# Patient Record
Sex: FEMALE | Race: WHITE | NOT HISPANIC OR LATINO | Employment: FULL TIME | URBAN - METROPOLITAN AREA
[De-identification: names, ages, dates, MRNs, and addresses within clinical notes are randomized per-mention and may not be internally consistent; named-entity substitution may affect disease eponyms.]

---

## 2022-06-02 ENCOUNTER — OFFICE VISIT (OUTPATIENT)
Dept: INTERNAL MEDICINE CLINIC | Facility: CLINIC | Age: 32
End: 2022-06-02
Payer: COMMERCIAL

## 2022-06-02 VITALS
RESPIRATION RATE: 18 BRPM | OXYGEN SATURATION: 96 % | DIASTOLIC BLOOD PRESSURE: 78 MMHG | BODY MASS INDEX: 45.64 KG/M2 | TEMPERATURE: 97.8 F | SYSTOLIC BLOOD PRESSURE: 122 MMHG | HEIGHT: 66 IN | WEIGHT: 284 LBS | HEART RATE: 82 BPM

## 2022-06-02 DIAGNOSIS — Z11.4 SCREENING FOR HIV (HUMAN IMMUNODEFICIENCY VIRUS): ICD-10-CM

## 2022-06-02 DIAGNOSIS — Z13.228 SCREENING FOR ENDOCRINE/METABOLIC/IMMUNITY DISORDERS: ICD-10-CM

## 2022-06-02 DIAGNOSIS — Z13.0 SCREENING FOR ENDOCRINE/METABOLIC/IMMUNITY DISORDERS: ICD-10-CM

## 2022-06-02 DIAGNOSIS — E66.01 CLASS 3 SEVERE OBESITY DUE TO EXCESS CALORIES IN ADULT, UNSPECIFIED BMI, UNSPECIFIED WHETHER SERIOUS COMORBIDITY PRESENT (HCC): Primary | ICD-10-CM

## 2022-06-02 DIAGNOSIS — Z13.0 SCREENING FOR IRON DEFICIENCY ANEMIA: ICD-10-CM

## 2022-06-02 DIAGNOSIS — Z12.4 SCREENING FOR CERVICAL CANCER: ICD-10-CM

## 2022-06-02 DIAGNOSIS — Z11.59 NEED FOR HEPATITIS C SCREENING TEST: ICD-10-CM

## 2022-06-02 DIAGNOSIS — Q24.9 CARDIAC ABNORMALITY: ICD-10-CM

## 2022-06-02 DIAGNOSIS — Z13.29 SCREENING FOR ENDOCRINE/METABOLIC/IMMUNITY DISORDERS: ICD-10-CM

## 2022-06-02 PROBLEM — E66.813 CLASS 3 SEVERE OBESITY DUE TO EXCESS CALORIES IN ADULT (HCC): Status: ACTIVE | Noted: 2022-06-02

## 2022-06-02 PROBLEM — F41.0 PANIC DISORDER: Status: ACTIVE | Noted: 2022-06-02

## 2022-06-02 PROCEDURE — 3725F SCREEN DEPRESSION PERFORMED: CPT

## 2022-06-02 PROCEDURE — 1036F TOBACCO NON-USER: CPT

## 2022-06-02 PROCEDURE — 99385 PREV VISIT NEW AGE 18-39: CPT

## 2022-06-02 PROCEDURE — 3008F BODY MASS INDEX DOCD: CPT

## 2022-06-02 RX ORDER — ARIPIPRAZOLE 15 MG/1
15 TABLET ORAL DAILY
COMMUNITY

## 2022-06-02 RX ORDER — CLONAZEPAM 1 MG/1
1 TABLET ORAL 2 TIMES DAILY
COMMUNITY

## 2022-06-02 RX ORDER — LITHIUM CARBONATE 300 MG/1
300 CAPSULE ORAL 3 TIMES DAILY
COMMUNITY
Start: 2022-04-03

## 2022-06-02 RX ORDER — QUETIAPINE FUMARATE 200 MG/1
TABLET, FILM COATED ORAL
COMMUNITY
Start: 2022-03-29

## 2022-06-02 RX ORDER — DIPHENHYDRAMINE HCL 25 MG
25 TABLET ORAL AS NEEDED
COMMUNITY

## 2022-06-02 RX ORDER — LAMOTRIGINE 200 MG/1
200 TABLET ORAL 2 TIMES DAILY
COMMUNITY
Start: 2022-02-23

## 2022-06-02 RX ORDER — BUSPIRONE HYDROCHLORIDE 15 MG/1
TABLET ORAL
COMMUNITY
Start: 2022-06-01 | End: 2022-08-09 | Stop reason: ALTCHOICE

## 2022-06-02 NOTE — PROGRESS NOTES
INTERNAL MEDICINE INITIAL OFFICE VISIT  [facility] - 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  Has not seen a cardiologist in several years  Referral placed for follow up    2  Class 3 severe obesity due to excess calories in adult, unspecified BMI, unspecified whether serious comorbidity present (HCC)  Limit carbs, sweets, portion control, increase water intake  Walk 30-40 minutes per day  Focus on 10 lbs weight loss at a time to not become overwhelmed  Discussed weight loss medication however patient would need to be evaluated by cardiology first due to cardiac history  3  Anxiety/Panic disorder:  Follows with psychiatry  She feels she is on the proper combination of medications at this time  Has her medical marijuana card for anxiety  Labs ordered:  CBC  TSH  Lipids  HGA1C  CMP    Referral for gynecology placed    BMI Counseling: Body mass index is 45 37 kg/m²  The BMI is above normal  Nutrition recommendations include decreasing portion sizes, encouraging healthy choices of fruits and vegetables, limiting drinks that contain sugar and moderation in carbohydrate intake  Exercise recommendations include exercising 3-5 times per week  No pharmacotherapy was ordered  Rationale for BMI follow-up plan is due to patient being overweight or obese  Depression Screening and Follow-up Plan: Patient was screened for depression during today's encounter  They screened negative with a PHQ-2 score of 1  Return in about 3 months (around 2022)  Chief Complaint:     Chief Complaint   Patient presents with    [facility]      History of Present Illness:   Patient presents today in the office to establish  She denies any complications or complaints at this time   She is concerned about her weight and would like to discuss options at this time  She has not had a primary care provider in quite some time and has no baseline labs to review  In 2018 she was found to have a lesion on her myocardium which a piece broke off and logged in her lung  She was taken to the hospital where she had open heart surgery to remove the lesion  She has not seen a gynecologist in several years  She denies smoking and drinking alcohol but does have her medical marijuana card for anxiety    The following portions of the patient's history were reviewed and updated as appropriate: allergies, current medications, past family history, past medical history, past social history, past surgical history and problem list      Review of Systems:     Review of Systems   Constitutional: Negative for appetite change, chills, diaphoresis, fatigue, fever and unexpected weight change  HENT: Negative for postnasal drip and sneezing  Eyes: Negative for visual disturbance  Respiratory: Negative for chest tightness and shortness of breath  Cardiovascular: Negative for chest pain, palpitations and leg swelling  Gastrointestinal: Negative for abdominal pain and blood in stool  Endocrine: Negative for cold intolerance, heat intolerance, polydipsia, polyphagia and polyuria  Genitourinary: Negative for difficulty urinating, dysuria, frequency and urgency  Musculoskeletal: Negative for arthralgias and myalgias  Skin: Negative for rash and wound  Neurological: Negative for dizziness, weakness, light-headedness and headaches  Hematological: Negative for adenopathy  Psychiatric/Behavioral: Negative for confusion, dysphoric mood and sleep disturbance  The patient is not nervous/anxious           Past Medical History:     Past Medical History:   Diagnosis Date    Anxiety     Bipolar 1 disorder ([facility] [address] ) 2018    Major depressive disorder 2018    Panic         Past Surgical History:     Past Surgical History: Procedure Laterality Date    ATRIAL MYXOMA EXCISION  09/10/2018    CHOLECYSTECTOMY  04/2014    WART EXCISION          Social History:     Social History     Socioeconomic History    Marital status: /Civil Union     Spouse name: None    Number of children: None    Years of education: None    Highest education level: None   Occupational History    None   Tobacco Use    Smoking status: Never Smoker    Smokeless tobacco: Never Used   Vaping Use    Vaping Use: Some days    Substances: THC   Substance and Sexual Activity    Alcohol use: Yes     Comment: occassionaly    Drug use: Yes     Types: Marijuana     Comment: has medical marjuana card    Sexual activity: None   Other Topics Concern    None   Social History Narrative    None     Social Determinants of Health     Financial Resource Strain: Not on file   Food Insecurity: Not on file   Transportation Needs: Not on file   Physical Activity: Inactive    Days of Exercise per Week: 0 days    Minutes of Exercise per Session: 0 min   Stress: Stress Concern Present    Feeling of Stress : Rather much   Social Connections: Not on file   Intimate Partner Violence: Not on file   Housing Stability: Not on file         Family History:     Family History   Problem Relation Age of Onset    Hypertension Maternal Grandmother     Cataracts Maternal Grandmother     Skin cancer Maternal Grandmother     Diabetes Maternal Grandfather     Heart block Maternal Grandfather         Current Medications:     Current Outpatient Medications:     ARIPiprazole (ABILIFY) 15 mg tablet, Take 15 mg by mouth daily, Disp: , Rfl:     busPIRone (BUSPAR) 15 mg tablet, , Disp: , Rfl:     clonazePAM (KlonoPIN) 1 mg tablet, Take 1 mg by mouth 2 (two) times a day, Disp: , Rfl:     diphenhydrAMINE (BENADRYL) 25 mg tablet, Take 25 mg by mouth if needed for itching, Disp: , Rfl:     lamoTRIgine (LaMICtal) 200 MG tablet, Take 200 mg by mouth 2 (two) times a day, Disp: , Rfl:    lithium carbonate 300 mg capsule, Take 300 mg by mouth 3 (three) times a day, Disp: , Rfl:     QUEtiapine (SEROquel) 200 mg tablet, TAKE 1 TABLET (200 MG) BY ORAL ROUTE BEFORE BED, Disp: , Rfl:      Allergies: Allergies   Allergen Reactions    Penicillins Other (See Comments)    Sulfa Antibiotics Other (See Comments)        Physical Exam:     /78 (BP Location: Left arm, Patient Position: Sitting, Cuff Size: Large)   Pulse 82   Temp 97 8 °F (36 6 °C) (Temporal) Comment: no nsaids  Resp 18   Ht 5' 6 34" (1 685 m)   Wt 129 kg (284 lb)   SpO2 96%   BMI 45 37 kg/m²     Physical Exam  Constitutional:       Appearance: She is well-developed  HENT:      Head: Normocephalic and atraumatic  Eyes:      Pupils: Pupils are equal, round, and reactive to light  Neck:      Thyroid: No thyromegaly  Cardiovascular:      Rate and Rhythm: Normal rate and regular rhythm  Heart sounds: No murmur heard  Pulmonary:      Effort: Pulmonary effort is normal       Breath sounds: Normal breath sounds  Abdominal:      General: Bowel sounds are normal       Palpations: Abdomen is soft  Musculoskeletal:         General: Normal range of motion  Cervical back: Normal range of motion and neck supple  Lymphadenopathy:      Cervical: No cervical adenopathy  Skin:     General: Skin is warm and dry  Neurological:      Mental Status: She is alert and oriented to person, place, and time             Data:     None available    LUZ MARINA Levy  [facility]

## 2022-06-04 ENCOUNTER — APPOINTMENT (OUTPATIENT)
Dept: LAB | Facility: CLINIC | Age: 32
End: 2022-06-04
Payer: COMMERCIAL

## 2022-06-04 DIAGNOSIS — Z13.0 SCREENING FOR ENDOCRINE/METABOLIC/IMMUNITY DISORDERS: ICD-10-CM

## 2022-06-04 DIAGNOSIS — Z13.29 SCREENING FOR ENDOCRINE/METABOLIC/IMMUNITY DISORDERS: ICD-10-CM

## 2022-06-04 DIAGNOSIS — Z13.228 SCREENING FOR ENDOCRINE/METABOLIC/IMMUNITY DISORDERS: ICD-10-CM

## 2022-06-04 DIAGNOSIS — Z13.0 SCREENING FOR IRON DEFICIENCY ANEMIA: ICD-10-CM

## 2022-06-04 DIAGNOSIS — E66.01 CLASS 3 SEVERE OBESITY DUE TO EXCESS CALORIES IN ADULT, UNSPECIFIED BMI, UNSPECIFIED WHETHER SERIOUS COMORBIDITY PRESENT (HCC): ICD-10-CM

## 2022-06-04 DIAGNOSIS — Z11.4 SCREENING FOR HIV (HUMAN IMMUNODEFICIENCY VIRUS): ICD-10-CM

## 2022-06-04 DIAGNOSIS — Z11.59 NEED FOR HEPATITIS C SCREENING TEST: ICD-10-CM

## 2022-06-04 LAB
ALBUMIN SERPL BCP-MCNC: 3.7 G/DL (ref 3.5–5)
ALP SERPL-CCNC: 86 U/L (ref 46–116)
ALT SERPL W P-5'-P-CCNC: 23 U/L (ref 12–78)
ANION GAP SERPL CALCULATED.3IONS-SCNC: 4 MMOL/L (ref 4–13)
AST SERPL W P-5'-P-CCNC: 12 U/L (ref 5–45)
BASOPHILS # BLD AUTO: 0.06 THOUSANDS/ΜL (ref 0–0.1)
BASOPHILS NFR BLD AUTO: 1 % (ref 0–1)
BILIRUB SERPL-MCNC: 0.41 MG/DL (ref 0.2–1)
BUN SERPL-MCNC: 14 MG/DL (ref 5–25)
CALCIUM SERPL-MCNC: 9.4 MG/DL (ref 8.3–10.1)
CHLORIDE SERPL-SCNC: 111 MMOL/L (ref 100–108)
CHOLEST SERPL-MCNC: 224 MG/DL
CO2 SERPL-SCNC: 26 MMOL/L (ref 21–32)
CREAT SERPL-MCNC: 0.96 MG/DL (ref 0.6–1.3)
EOSINOPHIL # BLD AUTO: 0.33 THOUSAND/ΜL (ref 0–0.61)
EOSINOPHIL NFR BLD AUTO: 3 % (ref 0–6)
ERYTHROCYTE [DISTWIDTH] IN BLOOD BY AUTOMATED COUNT: 12.9 % (ref 11.6–15.1)
EST. AVERAGE GLUCOSE BLD GHB EST-MCNC: 103 MG/DL
GFR SERPL CREATININE-BSD FRML MDRD: 77 ML/MIN/1.73SQ M
GLUCOSE P FAST SERPL-MCNC: 111 MG/DL (ref 65–99)
HBA1C MFR BLD: 5.2 %
HCT VFR BLD AUTO: 43.4 % (ref 34.8–46.1)
HCV AB SER QL: NORMAL
HDLC SERPL-MCNC: 34 MG/DL
HGB BLD-MCNC: 14.6 G/DL (ref 11.5–15.4)
IMM GRANULOCYTES # BLD AUTO: 0.05 THOUSAND/UL (ref 0–0.2)
IMM GRANULOCYTES NFR BLD AUTO: 1 % (ref 0–2)
LYMPHOCYTES # BLD AUTO: 2.52 THOUSANDS/ΜL (ref 0.6–4.47)
LYMPHOCYTES NFR BLD AUTO: 26 % (ref 14–44)
MCH RBC QN AUTO: 30.1 PG (ref 26.8–34.3)
MCHC RBC AUTO-ENTMCNC: 33.6 G/DL (ref 31.4–37.4)
MCV RBC AUTO: 90 FL (ref 82–98)
MONOCYTES # BLD AUTO: 0.57 THOUSAND/ΜL (ref 0.17–1.22)
MONOCYTES NFR BLD AUTO: 6 % (ref 4–12)
NEUTROPHILS # BLD AUTO: 6.31 THOUSANDS/ΜL (ref 1.85–7.62)
NEUTS SEG NFR BLD AUTO: 63 % (ref 43–75)
NONHDLC SERPL-MCNC: 190 MG/DL
NRBC BLD AUTO-RTO: 0 /100 WBCS
PLATELET # BLD AUTO: 322 THOUSANDS/UL (ref 149–390)
PMV BLD AUTO: 10.6 FL (ref 8.9–12.7)
POTASSIUM SERPL-SCNC: 3.9 MMOL/L (ref 3.5–5.3)
PROT SERPL-MCNC: 7.5 G/DL (ref 6.4–8.2)
RBC # BLD AUTO: 4.85 MILLION/UL (ref 3.81–5.12)
SODIUM SERPL-SCNC: 141 MMOL/L (ref 136–145)
TRIGL SERPL-MCNC: 459 MG/DL
TSH SERPL DL<=0.05 MIU/L-ACNC: 1.44 UIU/ML (ref 0.45–4.5)
WBC # BLD AUTO: 9.84 THOUSAND/UL (ref 4.31–10.16)

## 2022-06-04 PROCEDURE — 36415 COLL VENOUS BLD VENIPUNCTURE: CPT

## 2022-06-04 PROCEDURE — 80053 COMPREHEN METABOLIC PANEL: CPT

## 2022-06-04 PROCEDURE — 86803 HEPATITIS C AB TEST: CPT

## 2022-06-04 PROCEDURE — 80061 LIPID PANEL: CPT

## 2022-06-04 PROCEDURE — 84443 ASSAY THYROID STIM HORMONE: CPT

## 2022-06-04 PROCEDURE — 87389 HIV-1 AG W/HIV-1&-2 AB AG IA: CPT

## 2022-06-04 PROCEDURE — 85025 COMPLETE CBC W/AUTO DIFF WBC: CPT

## 2022-06-04 PROCEDURE — 83036 HEMOGLOBIN GLYCOSYLATED A1C: CPT

## 2022-06-06 ENCOUNTER — TELEPHONE (OUTPATIENT)
Dept: INTERNAL MEDICINE CLINIC | Facility: CLINIC | Age: 32
End: 2022-06-06

## 2022-06-06 LAB — HIV 1+2 AB+HIV1 P24 AG SERPL QL IA: NORMAL

## 2022-06-06 NOTE — TELEPHONE ENCOUNTER
----- Message from [id number] sent at 6/6/2022 12:50 PM EDT -----  Please let patient know that her triglycerides were high  She needs to limit fried fatty foods, sugary foods, carbs, sweets  Increase activity and increase water intake

## 2022-08-09 ENCOUNTER — CONSULT (OUTPATIENT)
Dept: CARDIOLOGY CLINIC | Facility: CLINIC | Age: 32
End: 2022-08-09
Payer: COMMERCIAL

## 2022-08-09 VITALS
RESPIRATION RATE: 16 BRPM | BODY MASS INDEX: 42.06 KG/M2 | WEIGHT: 268 LBS | HEART RATE: 77 BPM | SYSTOLIC BLOOD PRESSURE: 104 MMHG | OXYGEN SATURATION: 98 % | HEIGHT: 67 IN | DIASTOLIC BLOOD PRESSURE: 62 MMHG

## 2022-08-09 DIAGNOSIS — Z76.89 ESTABLISHING CARE WITH NEW DOCTOR, ENCOUNTER FOR: ICD-10-CM

## 2022-08-09 DIAGNOSIS — D15.1 ATRIAL MYXOMA: Primary | ICD-10-CM

## 2022-08-09 PROCEDURE — 93000 ELECTROCARDIOGRAM COMPLETE: CPT | Performed by: INTERNAL MEDICINE

## 2022-08-09 PROCEDURE — 99204 OFFICE O/P NEW MOD 45 MIN: CPT | Performed by: INTERNAL MEDICINE

## 2022-08-09 RX ORDER — BUSPIRONE HYDROCHLORIDE 30 MG/1
30 TABLET ORAL 2 TIMES DAILY
COMMUNITY
Start: 2022-06-20

## 2022-08-09 NOTE — PROGRESS NOTES
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  At that time she was having shortness of breath with exertion  She had successful surgery and has been fine since  However, she has noted some shortness of breath in the past year  However she gained 45 lb which she attributes to COVID in part  More recently she has been losing weight and has lost up to 20 lb  She is being considered for drug therapy in this regard and she needed to be cleared from a cardiac standpoint  She does have an elevated triglyceride level  1  Atrial myxoma  -     Echo complete w/ contrast if indicated; Future; Expected date: 08/09/2022    2   Establishing care with new doctor, encounter for  -     POCT ECG      Patient Active Problem List   Diagnosis    Panic disorder    Cardiac abnormality    Class 3 severe obesity due to excess calories in adult [facility])     Past Medical History:   Diagnosis Date    Anxiety     Bipolar 1 disorder ([facility] [address] ) 2018    Major depressive disorder 2018    Panic      Social History     Socioeconomic History    Marital status: /Civil Union     Spouse name: Not on file    Number of children: Not on file    Years of education: Not on file    Highest education level: Not on file   Occupational History    Not on file   Tobacco Use    Smoking status: Never Smoker    Smokeless tobacco: Never Used   Vaping Use    Vaping Use: Some days    Substances: THC   Substance and Sexual Activity    Alcohol use: Yes     Comment: occassionaly    Drug use: Yes     Types: Marijuana     Comment: has medical marjuana card    Sexual activity: Not on file   Other Topics Concern    Not on file   Social History Narrative    Not on file     Social Determinants of Health     Financial Resource Strain: Not on file   Food Insecurity: Not on file   Transportation Needs: Not on file   Physical Activity: Inactive    Days of Exercise per Week: 0 days    Minutes of Exercise per Session: 0 min   Stress: Stress Concern Present    Feeling of Stress : Rather much   Social Connections: Not on file   Intimate Partner Violence: Not on file   Housing Stability: Not on file      Family History   Problem Relation Age of Onset    Hypertension Maternal Grandmother     Cataracts Maternal Grandmother     Skin cancer Maternal Grandmother     Diabetes Maternal Grandfather     Heart block Maternal Grandfather      Past Surgical History:   Procedure Laterality Date    ATRIAL MYXOMA EXCISION  09/10/2018    CHOLECYSTECTOMY  04/2014    WART EXCISION         Current Outpatient Medications:     ARIPiprazole (ABILIFY) 15 mg tablet, Take 15 mg by mouth daily, Disp: , Rfl:     busPIRone (BUSPAR) 30 MG tablet, Take 30 mg by mouth 2 (two) times a day, Disp: , Rfl:     clonazePAM (KlonoPIN) 1 mg tablet, Take 1 mg by mouth 2 (two) times a day, Disp: , Rfl:     diphenhydrAMINE (BENADRYL) 25 mg tablet, Take 25 mg by mouth if needed for itching, Disp: , Rfl:     lamoTRIgine (LaMICtal) 200 MG tablet, Take 200 mg by mouth 2 (two) times a day, Disp: , Rfl:     lithium carbonate 300 mg capsule, Take 300 mg by mouth 3 (three) times a day Pt reported taking 1 tablet AM and 1 tablet PM, Disp: , Rfl:     QUEtiapine (SEROquel) 200 mg tablet, TAKE 1 TABLET (200 MG) BY ORAL ROUTE BEFORE BED, Disp: , Rfl:   Allergies   Allergen Reactions    Penicillins Other (See Comments)    Sulfa Antibiotics Other (See Comments)     Vitals:    08/09/22 1509   BP: 104/62   BP Location: Left arm   Patient Position: Sitting   Cuff Size: Large   Pulse: 77   Resp: 16   SpO2: 98%   Weight: 122 kg (268 lb)   Height: 5' 6 75" (1 695 m)       Labs:  No visits with results within 2 Month(s) from this visit  Latest known visit with results is:   Appointment on 06/04/2022   Component Date Value    Hepatitis C Ab 06/04/2022 Non-reactive     HIV-1/HIV-2 Ab 06/04/2022 Non-Reactive     WBC 06/04/2022 9 84     RBC 06/04/2022 4 85     Hemoglobin 06/04/2022 14 6     Hematocrit 06/04/2022 43 4     MCV 06/04/2022 90     MCH 06/04/2022 30 1     MCHC 06/04/2022 33 6     RDW 06/04/2022 12 9     MPV 06/04/2022 10 6     Platelets 56/03/3978 322     nRBC 06/04/2022 0     Neutrophils Relative 06/04/2022 63     Immat GRANS % 06/04/2022 1     Lymphocytes Relative 06/04/2022 26     Monocytes Relative 06/04/2022 6     Eosinophils Relative 06/04/2022 3     Basophils Relative 06/04/2022 1     Neutrophils Absolute 06/04/2022 6 31     Immature Grans Absolute 06/04/2022 0 05     Lymphocytes Absolute 06/04/2022 2 52     Monocytes Absolute 06/04/2022 0 57     Eosinophils Absolute 06/04/2022 0 33     Basophils Absolute 06/04/2022 0 06     Sodium 06/04/2022 141     Potassium 06/04/2022 3 9     Chloride 06/04/2022 111 (A)    CO2 06/04/2022 26     ANION GAP 06/04/2022 4     BUN 06/04/2022 14     Creatinine 06/04/2022 0 96     Glucose, Fasting 06/04/2022 111 (A)    Calcium 06/04/2022 9 4     AST 06/04/2022 12     ALT 06/04/2022 23     Alkaline Phosphatase 06/04/2022 86     Total Protein 06/04/2022 7 5     Albumin 06/04/2022 3 7     Total Bilirubin 06/04/2022 0 41     eGFR 06/04/2022 77     Cholesterol 06/04/2022 224 (A)    Triglycerides 06/04/2022 459 (A)    HDL, Direct 06/04/2022 34 (A)    LDL Calculated 06/04/2022      Non-HDL-Chol (CHOL-HDL) 06/04/2022 190     Hemoglobin A1C 06/04/2022 5 2     EAG 06/04/2022 103     TSH 3RD GENERATON 06/04/2022 1 440      Imaging: No results found  Review of Systems:  Review of Systems    Physical Exam:  She is in the morbidly obese class  Blood pressure 104/62  Heart rate 60, regular  Skin warm dry    Pupils equal  Carotids 2+ without bruits  Lungs clear  Rhythm regular  No murmurs or gallops  No organomegaly  No edema  Good strength in all extremities  EKG is normal    Discussion/Summary:    1  History of left atrial myxoma  2  Shortness of breath most likely secondary to obesity  3  Hypertriglyceridemia secondary to 2  Recommendations:    1  Increase activity level  2   Continue to work on weight loss  3  Echocardiogram now and every few years in the future to rule out recurrent left atrial myxoma which is relatively rare but which does exist   4  No problems from a cardiac standpoint with approved drugs for weight loss             Kasey Flynn MD

## 2022-08-11 DIAGNOSIS — E66.01 CLASS 3 SEVERE OBESITY DUE TO EXCESS CALORIES IN ADULT, UNSPECIFIED BMI, UNSPECIFIED WHETHER SERIOUS COMORBIDITY PRESENT (HCC): Primary | ICD-10-CM

## 2022-08-11 RX ORDER — PHENTERMINE HYDROCHLORIDE 15 MG/1
15 CAPSULE ORAL EVERY MORNING
Qty: 30 CAPSULE | Refills: 0 | Status: SHIPPED | OUTPATIENT
Start: 2022-08-11 | End: 2022-08-30 | Stop reason: SDUPTHER

## 2022-08-15 ENCOUNTER — TELEPHONE (OUTPATIENT)
Dept: INTERNAL MEDICINE CLINIC | Facility: CLINIC | Age: 32
End: 2022-08-15

## 2022-08-15 NOTE — TELEPHONE ENCOUNTER
Left detailed vm for, per rubina, advised pt to give the office a call back to schedule f/u appointment

## 2022-08-16 NOTE — TELEPHONE ENCOUNTER
PT returned call to schedule appt  Has appt at [address] on the 30th and was hoping to get an appt on that day before 2:35 pm  Or after 345  Only same day appts available  Can this pt take one of the same day slots? Also, advise pt to call in the morning to see if there are any same day appts available on that day  Please call if the same day appt is able to be done      [phone]

## 2022-08-30 ENCOUNTER — OFFICE VISIT (OUTPATIENT)
Dept: INTERNAL MEDICINE CLINIC | Facility: CLINIC | Age: 32
End: 2022-08-30
Payer: COMMERCIAL

## 2022-08-30 ENCOUNTER — HOSPITAL ENCOUNTER (OUTPATIENT)
Dept: NON INVASIVE DIAGNOSTICS | Facility: CLINIC | Age: 32
Discharge: HOME | End: 2022-08-30
Payer: COMMERCIAL

## 2022-08-30 VITALS
HEIGHT: 66 IN | DIASTOLIC BLOOD PRESSURE: 62 MMHG | SYSTOLIC BLOOD PRESSURE: 104 MMHG | WEIGHT: 268 LBS | BODY MASS INDEX: 43.07 KG/M2 | HEART RATE: 77 BPM

## 2022-08-30 VITALS
DIASTOLIC BLOOD PRESSURE: 80 MMHG | TEMPERATURE: 97.8 F | WEIGHT: 261.7 LBS | HEIGHT: 66 IN | RESPIRATION RATE: 16 BRPM | BODY MASS INDEX: 42.06 KG/M2 | OXYGEN SATURATION: 95 % | HEART RATE: 78 BPM | SYSTOLIC BLOOD PRESSURE: 118 MMHG

## 2022-08-30 DIAGNOSIS — Z12.4 SCREENING FOR CERVICAL CANCER: ICD-10-CM

## 2022-08-30 DIAGNOSIS — E66.01 CLASS 3 SEVERE OBESITY DUE TO EXCESS CALORIES IN ADULT, UNSPECIFIED BMI, UNSPECIFIED WHETHER SERIOUS COMORBIDITY PRESENT (HCC): Primary | ICD-10-CM

## 2022-08-30 DIAGNOSIS — D15.1 ATRIAL MYXOMA: ICD-10-CM

## 2022-08-30 DIAGNOSIS — Q24.9 CARDIAC ABNORMALITY: ICD-10-CM

## 2022-08-30 LAB
AORTIC ROOT: 3.1 CM
APICAL FOUR CHAMBER EJECTION FRACTION: 64 %
ASCENDING AORTA: 2.5 CM
AV LVOT PEAK GRADIENT: 2 MMHG
AV PEAK GRADIENT: 6 MMHG
E WAVE DECELERATION TIME: 263 MS
FRACTIONAL SHORTENING: 36 % (ref 28–44)
INTERVENTRICULAR SEPTUM IN DIASTOLE (PARASTERNAL SHORT AXIS VIEW): 0.9 CM
INTERVENTRICULAR SEPTUM: 0.9 CM (ref 0.6–1.1)
LAAS-AP2: 17.3 CM2
LAAS-AP4: 13 CM2
LEFT ATRIUM AREA SYSTOLE SINGLE PLANE A4C: 13.4 CM2
LEFT ATRIUM SIZE: 3.7 CM
LEFT INTERNAL DIMENSION IN SYSTOLE: 2.9 CM (ref 2.1–4)
LEFT VENTRICULAR INTERNAL DIMENSION IN DIASTOLE: 4.5 CM (ref 3.5–6)
LEFT VENTRICULAR POSTERIOR WALL IN END DIASTOLE: 0.7 CM
LEFT VENTRICULAR STROKE VOLUME: 60 ML
LVSV (TEICH): 60 ML
MV PEAK A VEL: 0.6 M/S
MV PEAK E VEL: 69 CM/S
MV STENOSIS PRESSURE HALF TIME: 76 MS
MV VALVE AREA P 1/2 METHOD: 2.89 CM2
RIGHT ATRIAL 2D VOLUME: 28 ML
RIGHT ATRIUM AREA SYSTOLE A4C: 13.2 CM2
RIGHT VENTRICLE ID DIMENSION: 4.1 CM
SL CV LEFT ATRIUM LENGTH A2C: 4.7 CM
SL CV LV EF: 55
SL CV PED ECHO LEFT VENTRICLE DIASTOLIC VOLUME (MOD BIPLANE) 2D: 93 ML
SL CV PED ECHO LEFT VENTRICLE SYSTOLIC VOLUME (MOD BIPLANE) 2D: 33 ML

## 2022-08-30 PROCEDURE — 93306 TTE W/DOPPLER COMPLETE: CPT | Performed by: INTERNAL MEDICINE

## 2022-08-30 PROCEDURE — 93306 TTE W/DOPPLER COMPLETE: CPT

## 2022-08-30 PROCEDURE — 99213 OFFICE O/P EST LOW 20 MIN: CPT

## 2022-08-30 RX ORDER — PHENTERMINE HYDROCHLORIDE 15 MG/1
15 CAPSULE ORAL EVERY MORNING
Qty: 30 CAPSULE | Refills: 0 | Status: SHIPPED | OUTPATIENT
Start: 2022-08-30 | End: 2022-09-26

## 2022-08-30 NOTE — PROGRESS NOTES
INTERNAL MEDICINE FOLLOW-UP VISIT  [facility]    NAME: Chi Sheldon  AGE: 27 y o  SEX: female  : 1990     DATE: 2022     Assessment and Plan:   1  Class 3 severe obesity due to excess calories in adult, unspecified BMI, unspecified whether serious comorbidity present ([address] )   Since  27 lb weight loss  Doing keto, and feeling well  Taking phentermine with no side effects  Drinking 65 oz per day  Did have a cheat day the other day and was upset  I counseled her that she is able to allow herself to "slip" and to focus on her overall health, how she feels when she eats healthy  Continue choosing healthy fats, fruits, vegetables, drinking water        Return in about 3 months (around 2022)  Chief Complaint:     Chief Complaint   Patient presents with    Follow-up     Medication        History of Present Illness:     Weigh loss    The following portions of the patient's history were reviewed and updated as appropriate: allergies, current medications, past family history, past medical history, past social history, past surgical history and problem list      Review of Systems:     Review of Systems   Constitutional: Negative for appetite change, chills, diaphoresis, fatigue, fever and unexpected weight change  HENT: Negative for postnasal drip and sneezing  Eyes: Negative for visual disturbance  Respiratory: Negative for chest tightness and shortness of breath  Cardiovascular: Negative for chest pain, palpitations and leg swelling  Gastrointestinal: Negative for abdominal pain and blood in stool  Endocrine: Negative for cold intolerance, heat intolerance, polydipsia, polyphagia and polyuria  Genitourinary: Negative for difficulty urinating, dysuria, frequency and urgency  Musculoskeletal: Negative for arthralgias and myalgias  Skin: Negative for rash and wound     Neurological: Negative for dizziness, weakness, light-headedness and headaches  Hematological: Negative for adenopathy  Psychiatric/Behavioral: Negative for confusion, dysphoric mood and sleep disturbance  The patient is not nervous/anxious  Past Medical History:     Past Medical History:   Diagnosis Date    Anxiety     Bipolar 1 disorder ([facility] [address] ) 2018    Major depressive disorder 2018    Panic         Current Medications:     Current Outpatient Medications:     ARIPiprazole (ABILIFY) 15 mg tablet, Take 15 mg by mouth daily, Disp: , Rfl:     busPIRone (BUSPAR) 30 MG tablet, Take 30 mg by mouth 2 (two) times a day, Disp: , Rfl:     clonazePAM (KlonoPIN) 1 mg tablet, Take 1 mg by mouth 2 (two) times a day, Disp: , Rfl:     lamoTRIgine (LaMICtal) 200 MG tablet, Take 200 mg by mouth 2 (two) times a day, Disp: , Rfl:     lithium carbonate 300 mg capsule, Take 300 mg by mouth 3 (three) times a day Pt reported taking 1 tablet AM and 1 tablet PM, Disp: , Rfl:     phentermine 15 MG capsule, Take 1 capsule (15 mg total) by mouth every morning, Disp: 30 capsule, Rfl: 0    QUEtiapine (SEROquel) 200 mg tablet, TAKE 1 TABLET (200 MG) BY ORAL ROUTE BEFORE BED, Disp: , Rfl:     diphenhydrAMINE (BENADRYL) 25 mg tablet, Take 25 mg by mouth if needed for itching (Patient not taking: Reported on 8/30/2022), Disp: , Rfl:      Allergies: Allergies   Allergen Reactions    Penicillins Other (See Comments)    Sulfa Antibiotics Other (See Comments)        Physical Exam:     /80 (BP Location: Left arm, Patient Position: Sitting, Cuff Size: Large)   Pulse 78   Temp 97 8 °F (36 6 °C) (Tympanic)   Resp 16   Ht 5' 6" (1 676 m)   Wt 119 kg (261 lb 11 2 oz)   SpO2 95%   BMI 42 24 kg/m²     Physical Exam  Constitutional:       Appearance: She is well-developed  HENT:      Head: Normocephalic and atraumatic  Eyes:      Pupils: Pupils are equal, round, and reactive to light  Neck:      Thyroid: No thyromegaly     Cardiovascular:      Rate and Rhythm: Normal rate and regular rhythm  Heart sounds: No murmur heard  Pulmonary:      Effort: Pulmonary effort is normal       Breath sounds: Normal breath sounds  Abdominal:      General: Bowel sounds are normal       Palpations: Abdomen is soft  Musculoskeletal:         General: Normal range of motion  Cervical back: Normal range of motion and neck supple  Lymphadenopathy:      Cervical: No cervical adenopathy  Skin:     General: Skin is warm and dry  Neurological:      Mental Status: She is alert and oriented to person, place, and time            LUZ MARINA Tanner  [facility] [address] [place] [address]

## 2022-09-26 DIAGNOSIS — E66.01 CLASS 3 SEVERE OBESITY DUE TO EXCESS CALORIES IN ADULT, UNSPECIFIED BMI, UNSPECIFIED WHETHER SERIOUS COMORBIDITY PRESENT (HCC): Primary | ICD-10-CM

## 2022-09-26 RX ORDER — PHENTERMINE HYDROCHLORIDE 30 MG/1
30 CAPSULE ORAL EVERY MORNING
Qty: 30 CAPSULE | Refills: 0 | Status: SHIPPED | OUTPATIENT
Start: 2022-09-26 | End: 2022-10-27 | Stop reason: SDUPTHER

## 2022-10-27 DIAGNOSIS — E66.01 CLASS 3 SEVERE OBESITY DUE TO EXCESS CALORIES IN ADULT, UNSPECIFIED BMI, UNSPECIFIED WHETHER SERIOUS COMORBIDITY PRESENT (HCC): ICD-10-CM

## 2022-10-27 RX ORDER — PHENTERMINE HYDROCHLORIDE 30 MG/1
30 CAPSULE ORAL EVERY MORNING
Qty: 30 CAPSULE | Refills: 0 | Status: SHIPPED | OUTPATIENT
Start: 2022-10-27

## 2022-11-25 DIAGNOSIS — E66.01 CLASS 3 SEVERE OBESITY DUE TO EXCESS CALORIES IN ADULT, UNSPECIFIED BMI, UNSPECIFIED WHETHER SERIOUS COMORBIDITY PRESENT (HCC): ICD-10-CM

## 2022-11-25 RX ORDER — PHENTERMINE HYDROCHLORIDE 30 MG/1
30 CAPSULE ORAL EVERY MORNING
Qty: 30 CAPSULE | Refills: 0 | Status: SHIPPED | OUTPATIENT
Start: 2022-11-25

## 2022-11-28 RX ORDER — ALBUTEROL SULFATE 90 UG/1
AEROSOL, METERED RESPIRATORY (INHALATION)
COMMUNITY
Start: 2022-10-27

## 2022-11-28 RX ORDER — OSELTAMIVIR PHOSPHATE 75 MG/1
CAPSULE ORAL
COMMUNITY
Start: 2022-10-27 | End: 2022-11-29

## 2022-11-28 RX ORDER — BENZONATATE 200 MG/1
CAPSULE ORAL
COMMUNITY
Start: 2022-10-27

## 2022-11-29 ENCOUNTER — OFFICE VISIT (OUTPATIENT)
Dept: INTERNAL MEDICINE CLINIC | Facility: CLINIC | Age: 32
End: 2022-11-29

## 2022-11-29 VITALS
OXYGEN SATURATION: 97 % | DIASTOLIC BLOOD PRESSURE: 60 MMHG | WEIGHT: 240.4 LBS | HEART RATE: 81 BPM | HEIGHT: 66 IN | TEMPERATURE: 97.7 F | BODY MASS INDEX: 38.63 KG/M2 | SYSTOLIC BLOOD PRESSURE: 110 MMHG

## 2022-11-29 DIAGNOSIS — Z12.4 SCREENING FOR CERVICAL CANCER: ICD-10-CM

## 2022-11-29 DIAGNOSIS — E66.01 CLASS 3 SEVERE OBESITY DUE TO EXCESS CALORIES IN ADULT, UNSPECIFIED BMI, UNSPECIFIED WHETHER SERIOUS COMORBIDITY PRESENT (HCC): Primary | ICD-10-CM

## 2022-11-29 DIAGNOSIS — F41.0 PANIC DISORDER: ICD-10-CM

## 2022-11-29 NOTE — PROGRESS NOTES
INTERNAL MEDICINE FOLLOW-UP VISIT  [facility] - [facility]    NAME: Jerrica Allen  AGE: 27 y o  SEX: female  : 1990     DATE: 2022     Assessment and Plan:   1  Class 3 severe obesity due to excess calories in adult, unspecified BMI, unspecified whether serious comorbidity present ([address] )  52 lbs down since   Still on phentermine 30 mg daily, no side effects   May consider mounjaro injections if she plateaus on phentermine  Continue daily exercise as well as limiting her carbohydrates and sweets    2  Panic disorder  Clonazepam, abilify tolerating well as well as lithium follows with behavioral health        No follow-ups on file  Chief Complaint:     Chief Complaint   Patient presents with   • Follow-up     3 months       History of Present Illness:     Patient is here today for a follow-up  She has been taking phentermine for weight loss and is doing very well  She has lost 52 lb since   She denies any side effects of medications at this time  The following portions of the patient's history were reviewed and updated as appropriate: allergies, current medications, past family history, past medical history, past social history, past surgical history and problem list      Review of Systems:     Review of Systems   Constitutional: Negative for appetite change, chills, diaphoresis, fatigue, fever and unexpected weight change  HENT: Negative for postnasal drip and sneezing  Eyes: Negative for visual disturbance  Respiratory: Negative for chest tightness and shortness of breath  Cardiovascular: Negative for chest pain, palpitations and leg swelling  Gastrointestinal: Negative for abdominal pain and blood in stool  Endocrine: Negative for cold intolerance, heat intolerance, polydipsia, polyphagia and polyuria  Genitourinary: Negative for difficulty urinating, dysuria, frequency and urgency     Musculoskeletal: Negative for arthralgias and myalgias  Skin: Negative for rash and wound  Neurological: Negative for dizziness, weakness, light-headedness and headaches  Hematological: Negative for adenopathy  Psychiatric/Behavioral: Negative for confusion, dysphoric mood and sleep disturbance  The patient is not nervous/anxious  Past Medical History:     Past Medical History:   Diagnosis Date   • Anxiety    • Bipolar 1 disorder ([facility] [address] ) 2018   • Major depressive disorder 2018   • Panic         Current Medications:     Current Outpatient Medications:   •  albuterol (PROVENTIL HFA,VENTOLIN HFA) 90 mcg/act inhaler, , Disp: , Rfl:   •  ARIPiprazole (ABILIFY) 15 mg tablet, Take 15 mg by mouth daily, Disp: , Rfl:   •  benzonatate (TESSALON) 200 MG capsule, , Disp: , Rfl:   •  busPIRone (BUSPAR) 30 MG tablet, Take 30 mg by mouth 2 (two) times a day, Disp: , Rfl:   •  clonazePAM (KlonoPIN) 1 mg tablet, Take 1 mg by mouth 2 (two) times a day, Disp: , Rfl:   •  lamoTRIgine (LaMICtal) 200 MG tablet, Take 200 mg by mouth 2 (two) times a day, Disp: , Rfl:   •  lithium carbonate 300 mg capsule, Take 300 mg by mouth 3 (three) times a day Pt reported taking 1 tablet AM and 1 tablet PM, Disp: , Rfl:   •  phentermine 30 MG capsule, Take 1 capsule (30 mg total) by mouth every morning, Disp: 30 capsule, Rfl: 0  •  QUEtiapine (SEROquel) 200 mg tablet, TAKE 1 TABLET (200 MG) BY ORAL ROUTE BEFORE BED, Disp: , Rfl:   •  diphenhydrAMINE (BENADRYL) 25 mg tablet, Take 25 mg by mouth if needed for itching (Patient not taking: Reported on 8/30/2022), Disp: , Rfl:   •  oseltamivir (TAMIFLU) 75 mg capsule, , Disp: , Rfl:      Allergies:      Allergies   Allergen Reactions   • Penicillins Other (See Comments)   • Sulfa Antibiotics Other (See Comments)        Physical Exam:     /60 (BP Location: Left arm, Patient Position: Sitting, Cuff Size: Standard) Comment: bp  Pulse 81   Temp 97 7 °F (36 5 °C) (Temporal) Comment: no  Ht 5' 6" (1 676 m)   Wt 109 kg (240 lb 6 4 oz)   SpO2 97%   BMI 38 80 kg/m²     Physical Exam  Constitutional:       Appearance: She is well-developed and well-nourished  HENT:      Head: Normocephalic and atraumatic  Eyes:      Pupils: Pupils are equal, round, and reactive to light  Neck:      Thyroid: No thyromegaly  Cardiovascular:      Rate and Rhythm: Normal rate and regular rhythm  Heart sounds: No murmur heard  Pulmonary:      Effort: Pulmonary effort is normal       Breath sounds: Normal breath sounds  Abdominal:      General: Bowel sounds are normal       Palpations: Abdomen is soft  Musculoskeletal:         General: Normal range of motion  Cervical back: Normal range of motion and neck supple  Lymphadenopathy:      Cervical: No cervical adenopathy  Skin:     General: Skin is warm and dry  Neurological:      Mental Status: She is alert and oriented to person, place, and time  Data:     Laboratory Results: I have personally reviewed the pertinent laboratory results/reports   Radiology/Other Diagnostic Testing Results: I have personally reviewed pertinent reports        LUZ MARINA Dumont  [facility]

## 2023-01-06 DIAGNOSIS — E66.01 CLASS 3 SEVERE OBESITY DUE TO EXCESS CALORIES IN ADULT, UNSPECIFIED BMI, UNSPECIFIED WHETHER SERIOUS COMORBIDITY PRESENT (HCC): ICD-10-CM

## 2023-01-09 RX ORDER — PHENTERMINE HYDROCHLORIDE 30 MG/1
30 CAPSULE ORAL EVERY MORNING
Qty: 30 CAPSULE | Refills: 0 | Status: SHIPPED | OUTPATIENT
Start: 2023-01-09

## 2023-01-20 ENCOUNTER — OFFICE VISIT (OUTPATIENT)
Dept: INTERNAL MEDICINE CLINIC | Facility: CLINIC | Age: 33
End: 2023-01-20

## 2023-01-20 VITALS
HEART RATE: 89 BPM | BODY MASS INDEX: 39.83 KG/M2 | DIASTOLIC BLOOD PRESSURE: 64 MMHG | OXYGEN SATURATION: 99 % | SYSTOLIC BLOOD PRESSURE: 102 MMHG | RESPIRATION RATE: 18 BRPM | TEMPERATURE: 97.1 F | WEIGHT: 246.8 LBS

## 2023-01-20 DIAGNOSIS — R68.84 PAIN, MANDIBULAR: Primary | ICD-10-CM

## 2023-01-20 RX ORDER — METHYLPREDNISOLONE 4 MG/1
TABLET ORAL
Qty: 21 EACH | Refills: 0 | Status: SHIPPED | OUTPATIENT
Start: 2023-01-20

## 2023-01-20 NOTE — PROGRESS NOTES
Name: Irene Diaz      : 1990      MRN: [id number]  Encounter Provider: Keira Peralta DO  Encounter Date: 2023   Encounter department: [facility]   [address]     1  Pain, mandibular- difficult includes TMJ vs  Myofacial strain  Low suspicion for parotitis due to lack of facial swelling and tenderness of over the left parotid gland  Will provide steroidal antiinflammatory for analgesia  -     methylPREDNISolone 4 MG tablet therapy pack; Use as directed on package           Subjective      Onset three days ago   siginficant discomfort on left side of neck near her jaw  denies trauma  Denies pain with talking, chewing, or drinking  Some discomfort when trying to clear her throat of mucus  Denies fevers, chills, sore throat or facial swelling   Does have a chronic facial droop from bells palsy         Current Outpatient Medications on File Prior to Visit   Medication Sig   • albuterol (PROVENTIL HFA,VENTOLIN HFA) 90 mcg/act inhaler    • ARIPiprazole (ABILIFY) 15 mg tablet Take 15 mg by mouth daily   • busPIRone (BUSPAR) 30 MG tablet Take 30 mg by mouth 2 (two) times a day   • clonazePAM (KlonoPIN) 1 mg tablet Take 1 mg by mouth 2 (two) times a day   • lamoTRIgine (LaMICtal) 200 MG tablet Take 200 mg by mouth 2 (two) times a day   • lithium carbonate 300 mg capsule Take 300 mg by mouth 3 (three) times a day Pt reported taking 1 tablet AM and 1 tablet PM   • phentermine 30 MG capsule Take 1 capsule (30 mg total) by mouth every morning   • QUEtiapine (SEROquel) 200 mg tablet TAKE 1 TABLET (200 MG) BY ORAL ROUTE BEFORE BED   • benzonatate (TESSALON) 200 MG capsule  (Patient not taking: Reported on 2023)       Objective     /64 (BP Location: Left arm, Patient Position: Sitting, Cuff Size: Standard)   Pulse 89   Temp (!) 97 1 °F (36 2 °C) (Temporal)   Resp 18   Wt 112 kg (246 lb 12 8 oz)   SpO2 99%   BMI 39 83 kg/m²     Physical Exam  HENT: Head: Normocephalic  Jaw: No trismus, swelling or pain on movement  Salivary Glands: Right salivary gland is not diffusely enlarged or tender  Left salivary gland is not diffusely enlarged or tender  Mouth/Throat:      Lips: Pink  Tongue: No lesions  Pharynx: Oropharynx is clear  Uvula midline  No pharyngeal swelling or posterior oropharyngeal erythema  Tonsils: No tonsillar exudate or tonsillar abscesses  Neck:      Thyroid: No thyroid tenderness  Cardiovascular:      Rate and Rhythm: Normal rate  Musculoskeletal:      Cervical back: Normal range of motion and neck supple  Tenderness (submandibular close to mandibular angle within the body of the platysma -left side ) present  Muscular tenderness present  No pain with movement  Lymphadenopathy:      Cervical: No cervical adenopathy  Neurological:      Mental Status: She is alert and oriented to person, place, and time  Cranial Nerves: Facial asymmetry (left sided droop-chronic ) present         Rosalio Quintero DO

## 2023-01-23 ENCOUNTER — OFFICE VISIT (OUTPATIENT)
Dept: INTERNAL MEDICINE CLINIC | Facility: CLINIC | Age: 33
End: 2023-01-23

## 2023-01-23 VITALS
OXYGEN SATURATION: 98 % | HEART RATE: 92 BPM | WEIGHT: 293 LBS | DIASTOLIC BLOOD PRESSURE: 68 MMHG | TEMPERATURE: 97.8 F | BODY MASS INDEX: 56.56 KG/M2 | SYSTOLIC BLOOD PRESSURE: 100 MMHG | RESPIRATION RATE: 18 BRPM

## 2023-01-23 DIAGNOSIS — J02.9 PHARYNGITIS, UNSPECIFIED ETIOLOGY: Primary | ICD-10-CM

## 2023-01-23 LAB
SARS-COV-2 AG UPPER RESP QL IA: NEGATIVE
VALID CONTROL: NORMAL

## 2023-01-23 RX ORDER — TRAZODONE HYDROCHLORIDE 50 MG/1
50 TABLET ORAL DAILY
COMMUNITY
Start: 2023-01-17

## 2023-01-23 RX ORDER — CLINDAMYCIN HYDROCHLORIDE 300 MG/1
300 CAPSULE ORAL 3 TIMES DAILY
Qty: 15 CAPSULE | Refills: 0 | Status: SHIPPED | OUTPATIENT
Start: 2023-01-23 | End: 2023-01-28

## 2023-01-23 NOTE — LETTER
January 23, 2023     Patient: Naomy Mckeon  YOB: 1990  Date of Visit: 1/23/2023      To Whom it May Concern:    Naomy Mckeon is under my professional care  Gage See was seen in my office on 1/23/2023  Julianadara Felixfermín may return to work on 1/24/2023  If you have any questions or concerns, please don't hesitate to call           Sincerely,          Rosalio Quintero,         CC: No Recipients

## 2023-01-23 NOTE — LETTER
January 23, 2023     Patient: Afshin Prasad  YOB: 1990  Date of Visit: 1/23/2023      To Whom it May Concern:    Afshin Prasad is under my professional care  Brendan Steinberg was seen in my office on 1/23/2023  Brendan Steinberg may return to work on 1/25/2023  If you have any questions or concerns, please don't hesitate to call           Sincerely,          Nasir Martinez, DO

## 2023-01-23 NOTE — PROGRESS NOTES
Name: Caron Bowen      : 1990      MRN: [id number]  Encounter Provider: Bebe Villalobos DO  Encounter Date: 2023   Encounter department: [facility]   [address]     1  Pharyngitis, unspecified etiology  -     POCT Rapid Covid Ag  -     clindamycin (CLEOCIN) 300 MG capsule; Take 1 capsule (300 mg total) by mouth 3 (three) times a day for 5 days       progressive symptoms  Pain refractory to steroidal analgesia  Rapid covid antigen negative  Due to pcn allergy will treat with therapy above  Subjective      Started taking the steroids  No improvement  Over the weekend started to have a dry cough, ear pian and now a sore throat  Uncomfortable to swallow  Denies fever  Review of Systems   Constitutional: Negative for fever  HENT: Positive for sore throat and trouble swallowing  Respiratory: Positive for cough  Cardiovascular: Negative for chest pain  Neurological: Positive for headaches         Current Outpatient Medications on File Prior to Visit   Medication Sig   • albuterol (PROVENTIL HFA,VENTOLIN HFA) 90 mcg/act inhaler    • ARIPiprazole (ABILIFY) 15 mg tablet Take 15 mg by mouth daily   • busPIRone (BUSPAR) 30 MG tablet Take 30 mg by mouth 2 (two) times a day   • clonazePAM (KlonoPIN) 1 mg tablet Take 1 mg by mouth 2 (two) times a day   • lamoTRIgine (LaMICtal) 200 MG tablet Take 200 mg by mouth 2 (two) times a day   • lithium carbonate 300 mg capsule Take 300 mg by mouth 3 (three) times a day Pt reported taking 1 tablet AM and 1 tablet PM   • methylPREDNISolone 4 MG tablet therapy pack Use as directed on package   • phentermine 30 MG capsule Take 1 capsule (30 mg total) by mouth every morning   • QUEtiapine (SEROquel) 200 mg tablet TAKE 1 TABLET (200 MG) BY ORAL ROUTE BEFORE BED   • traZODone (DESYREL) 50 mg tablet Take 50 mg by mouth daily   • [DISCONTINUED] benzonatate (TESSALON) 200 MG capsule  (Patient not taking: Reported on 1/20/2023)       Objective     /68 (BP Location: Left arm, Patient Position: Sitting, Cuff Size: Large)   Pulse 92   Temp 97 8 °F (36 6 °C) (Temporal)   Resp 18   Wt (!) 159 kg (350 lb 6 4 oz)   SpO2 98%   BMI 56 56 kg/m²     Physical Exam  HENT:      Head: Normocephalic  Mouth/Throat:      Mouth: Mucous membranes are dry  Pharynx: Posterior oropharyngeal erythema present  No oropharyngeal exudate  Eyes:      Conjunctiva/sclera: Conjunctivae normal    Cardiovascular:      Rate and Rhythm: Normal rate  Pulmonary:      Breath sounds: Normal breath sounds  Musculoskeletal:      Cervical back: Tenderness present  Lymphadenopathy:      Cervical: Cervical adenopathy (shoddy ant  chain left ) present  Neurological:      Mental Status: She is alert and oriented to person, place, and time         Keira Peralta DO

## 2023-02-14 DIAGNOSIS — E66.01 CLASS 3 SEVERE OBESITY DUE TO EXCESS CALORIES IN ADULT, UNSPECIFIED BMI, UNSPECIFIED WHETHER SERIOUS COMORBIDITY PRESENT (HCC): ICD-10-CM

## 2023-02-15 RX ORDER — PHENTERMINE HYDROCHLORIDE 30 MG/1
30 CAPSULE ORAL EVERY MORNING
Qty: 30 CAPSULE | Refills: 0 | Status: SHIPPED | OUTPATIENT
Start: 2023-02-15

## 2023-03-20 DIAGNOSIS — E66.01 CLASS 3 SEVERE OBESITY DUE TO EXCESS CALORIES IN ADULT, UNSPECIFIED BMI, UNSPECIFIED WHETHER SERIOUS COMORBIDITY PRESENT (HCC): ICD-10-CM

## 2023-03-21 RX ORDER — PHENTERMINE HYDROCHLORIDE 30 MG/1
30 CAPSULE ORAL EVERY MORNING
Qty: 30 CAPSULE | Refills: 0 | Status: SHIPPED | OUTPATIENT
Start: 2023-03-21

## 2023-04-24 DIAGNOSIS — E66.01 CLASS 3 SEVERE OBESITY DUE TO EXCESS CALORIES IN ADULT, UNSPECIFIED BMI, UNSPECIFIED WHETHER SERIOUS COMORBIDITY PRESENT (HCC): ICD-10-CM

## 2023-04-24 RX ORDER — PHENTERMINE HYDROCHLORIDE 30 MG/1
30 CAPSULE ORAL EVERY MORNING
Qty: 30 CAPSULE | Refills: 0 | Status: SHIPPED | OUTPATIENT
Start: 2023-04-24

## 2023-07-13 ENCOUNTER — OFFICE VISIT (OUTPATIENT)
Age: 33
End: 2023-07-13
Payer: COMMERCIAL

## 2023-07-13 ENCOUNTER — APPOINTMENT (OUTPATIENT)
Age: 33
End: 2023-07-13
Payer: COMMERCIAL

## 2023-07-13 VITALS
BODY MASS INDEX: 43.42 KG/M2 | HEART RATE: 80 BPM | OXYGEN SATURATION: 98 % | WEIGHT: 269 LBS | DIASTOLIC BLOOD PRESSURE: 76 MMHG | TEMPERATURE: 97.9 F | RESPIRATION RATE: 18 BRPM | SYSTOLIC BLOOD PRESSURE: 108 MMHG

## 2023-07-13 DIAGNOSIS — S99.912A LEFT ANKLE INJURY, INITIAL ENCOUNTER: ICD-10-CM

## 2023-07-13 DIAGNOSIS — S99.912A LEFT ANKLE INJURY, INITIAL ENCOUNTER: Primary | ICD-10-CM

## 2023-07-13 PROCEDURE — 73610 X-RAY EXAM OF ANKLE: CPT

## 2023-07-13 PROCEDURE — G0382 LEV 3 HOSP TYPE B ED VISIT: HCPCS | Performed by: PHYSICIAN ASSISTANT

## 2023-07-13 RX ORDER — IBUPROFEN 200 MG
400 TABLET ORAL EVERY 6 HOURS PRN
Qty: 30 TABLET | Refills: 0 | Status: SHIPPED | OUTPATIENT
Start: 2023-07-13

## 2023-07-13 RX ORDER — SENNOSIDES 8.6 MG
650 CAPSULE ORAL EVERY 8 HOURS PRN
Qty: 30 TABLET | Refills: 0 | Status: SHIPPED | OUTPATIENT
Start: 2023-07-13

## 2023-07-13 NOTE — PROGRESS NOTES
[facility]        NAME: Ángel Gardner is a 27 y.o. female  : 1990    MRN: [id number]  DATE: 2023  TIME: 2:08 PM    Assessment and Plan   Left ankle injury, initial encounter [S99.912A]  1. Left ankle injury, initial encounter  XR ankle 3+ vw left    acetaminophen (TYLENOL) 650 mg CR tablet    ibuprofen (MOTRIN) 200 mg tablet    Ambulatory Referral to Orthopedic Surgery            Patient Instructions     Your preliminary x-rays revealed no acute findings however a radiologist will review and if there are any significant changes and findings I will contact you to discuss a new treatment plan if warranted. For now: Left ankle Aircast and crutches provided - patient was educated on use. Ibuprofen 200 mg 1 to 2 tablets every 6 hours. Tylenol 650 mg 1 tablet every 6 hours for breakthrough pain. Elevate  Rest  Ice for 15 minutes with skin barrier 3-4 times daily. Orthopedic referral was generated on your behalf. Follow up with PCP in 3-5 days. Proceed to  ER if symptoms worsen. Chief Complaint     Chief Complaint   Patient presents with   • Leg Injury     Pain R calf, L ankle. Claims overturned ankle and fell down several bare wooden steps at 0900. Denies other injury, LOC         History of Present Illness       26 yo female reports twisting her left ankle inwardly at the top of her 7 step set of stairs causing her to fall onto her buttocks and sliding down 4 steps. Right calve obtain a friction rub as she slid, as per patient. Left ankle pain is 7-9/10, constant, no alleviating factors, aggravated with flexion, weight bearing. Denies numbness, paresthesia, weakness.          Review of Systems   Review of Systems      Current Medications       Current Outpatient Medications:   •  acetaminophen (TYLENOL) 650 mg CR tablet, Take 1 tablet (650 mg total) by mouth every 8 (eight) hours as needed for mild pain, Disp: 30 tablet, Rfl: 0  •  albuterol (PROVENTIL HFA,VENTOLIN HFA) 90 mcg/act inhaler, , Disp: , Rfl:   •  ARIPiprazole (ABILIFY) 15 mg tablet, Take 15 mg by mouth daily, Disp: , Rfl:   •  busPIRone (BUSPAR) 30 MG tablet, Take 30 mg by mouth 2 (two) times a day, Disp: , Rfl:   •  clonazePAM (KlonoPIN) 1 mg tablet, Take 1 mg by mouth 2 (two) times a day, Disp: , Rfl:   •  ibuprofen (MOTRIN) 200 mg tablet, Take 2 tablets (400 mg total) by mouth every 6 (six) hours as needed for mild pain, Disp: 30 tablet, Rfl: 0  •  lamoTRIgine (LaMICtal) 200 MG tablet, Take 200 mg by mouth 2 (two) times a day, Disp: , Rfl:   •  lithium carbonate 300 mg capsule, Take 300 mg by mouth 3 (three) times a day Pt reported taking 1 tablet AM and 1 tablet PM, Disp: , Rfl:   •  QUEtiapine (SEROquel) 200 mg tablet, TAKE 1 TABLET (200 MG) BY ORAL ROUTE BEFORE BED, Disp: , Rfl:   •  traZODone (DESYREL) 50 mg tablet, Take 50 mg by mouth daily, Disp: , Rfl:   •  methylPREDNISolone 4 MG tablet therapy pack, Use as directed on package (Patient not taking: Reported on 7/13/2023), Disp: 21 each, Rfl: 0  •  phentermine 30 MG capsule, Take 1 capsule (30 mg total) by mouth every morning (Patient not taking: Reported on 7/13/2023), Disp: 30 capsule, Rfl: 0    Current Allergies     Allergies as of 07/13/2023 - Reviewed 07/13/2023   Allergen Reaction Noted   • Penicillins Other (See Comments) 02/18/2020   • Sulfa antibiotics Other (See Comments) 02/18/2020            The following portions of the patient's history were reviewed and updated as appropriate: allergies, current medications, past family history, past medical history, past social history, past surgical history and problem list.     Past Medical History:   Diagnosis Date   • Anxiety    • Bipolar 1 disorder ([address]) 2018   • Major depressive disorder 2018   • Panic        Past Surgical History:   Procedure Laterality Date   • ATRIAL MYXOMA EXCISION  09/10/2018   • CHOLECYSTECTOMY  04/2014   • WART EXCISION         Family History   Problem Relation Age of Onset   • Hypertension Maternal Grandmother    • Cataracts Maternal Grandmother    • Skin cancer Maternal Grandmother    • Diabetes Maternal Grandfather    • Heart block Maternal Grandfather          Medications have been verified. Objective   /76 (BP Location: Left arm, Patient Position: Sitting, Cuff Size: Large)   Pulse 80   Temp 97.9 °F (36.6 °C) (Tympanic)   Resp 18   Wt 122 kg (269 lb)   LMP 06/20/2023 (Exact Date)   SpO2 98%   BMI 43.42 kg/m²        Physical Exam     Physical Exam  Vitals and nursing note reviewed. Constitutional:       General: She is not in acute distress. Appearance: Normal appearance. She is not ill-appearing. Eyes:      Conjunctiva/sclera: Conjunctivae normal.      Pupils: Pupils are equal, round, and reactive to light. Cardiovascular:      Rate and Rhythm: Normal rate and regular rhythm. Pulmonary:      Effort: Pulmonary effort is normal. No respiratory distress. Musculoskeletal:      Cervical back: Normal range of motion and neck supple. Comments: Left ankle is slightly swollen over the lateral malleolus. Skin is intact. Inflammation and tenderness on palpation of the area is noted. There is tenderness also on dorsiflex. Mild tenderness on eversion and inversion as well as plantarflex. Pulses are 2+ and symmetric. There is no ecchymosis, coloration, or warmth. Skin:     General: Skin is warm. Findings: No bruising, erythema or lesion. Neurological:      General: No focal deficit present. Mental Status: She is alert and oriented to person, place, and time. Coordination: Coordination normal.      Gait: Gait abnormal.   Psychiatric:         Mood and Affect: Mood normal.         Behavior: Behavior normal.         Thought Content: Thought content normal.         Judgment: Judgment normal.       Patient was discharged in no acute distress. Able to utilize crutches as directed.

## 2023-07-13 NOTE — LETTER
July 13, 2023     Patient: Alex Lewis   YOB: 1990   Date of Visit: 7/13/2023       To Whom it May Concern:    Alex Lewis was seen in my clinic on 7/13/2023. She may return to work on 7/14/2023 . If you have any questions or concerns, please don't hesitate to call.          Sincerely,          LIZETTE SUE        CC: No Recipients

## 2023-10-16 ENCOUNTER — OFFICE VISIT (OUTPATIENT)
Age: 33
End: 2023-10-16
Payer: COMMERCIAL

## 2023-10-16 VITALS
TEMPERATURE: 97.5 F | HEART RATE: 97 BPM | BODY MASS INDEX: 49.45 KG/M2 | SYSTOLIC BLOOD PRESSURE: 118 MMHG | DIASTOLIC BLOOD PRESSURE: 82 MMHG | RESPIRATION RATE: 18 BRPM | OXYGEN SATURATION: 97 % | WEIGHT: 293 LBS

## 2023-10-16 DIAGNOSIS — T14.8XXA MUSCLE STRAIN: Primary | ICD-10-CM

## 2023-10-16 DIAGNOSIS — J45.20 MILD INTERMITTENT ASTHMA, UNSPECIFIED WHETHER COMPLICATED: ICD-10-CM

## 2023-10-16 PROCEDURE — 99213 OFFICE O/P EST LOW 20 MIN: CPT

## 2023-10-16 RX ORDER — QUETIAPINE FUMARATE 100 MG/1
200 TABLET, FILM COATED ORAL
COMMUNITY
Start: 2023-08-22

## 2023-10-16 RX ORDER — METHOCARBAMOL 500 MG/1
500 TABLET, FILM COATED ORAL 4 TIMES DAILY
Qty: 30 TABLET | Refills: 0 | Status: SHIPPED | OUTPATIENT
Start: 2023-10-16

## 2023-10-16 RX ORDER — ALBUTEROL SULFATE 90 UG/1
2 AEROSOL, METERED RESPIRATORY (INHALATION) EVERY 6 HOURS PRN
Qty: 18 G | Refills: 1 | Status: SHIPPED | OUTPATIENT
Start: 2023-10-16

## 2023-10-16 RX ORDER — MELOXICAM 7.5 MG/1
7.5 TABLET ORAL DAILY PRN
Qty: 30 TABLET | Refills: 3 | Status: SHIPPED | OUTPATIENT
Start: 2023-10-16

## 2023-10-16 RX ORDER — ARIPIPRAZOLE 20 MG/1
20 TABLET ORAL DAILY
COMMUNITY
Start: 2023-08-22

## 2023-10-16 RX ORDER — MELOXICAM 15 MG/1
15 TABLET ORAL DAILY
Qty: 15 TABLET | Refills: 0 | Status: CANCELLED | OUTPATIENT
Start: 2023-10-16

## 2023-10-16 NOTE — PROGRESS NOTES
Name: Robles Peacock      : 1990      MRN: [id number]  Encounter Provider: LUZ MARINA Hidalgo  Encounter Date: 10/16/2023   Encounter department: [address]     1. Muscle strain  Encouraged to rest, heat/ice topical analgesic, muscle relaxer and anti-inflammatory. Patient aware symptoms can last up to 6 weeks. She will follow-up in the office if no improvement or for worsening symptoms. Consider imaging and physical therapy for recurrence/failure to respond to treatment. - methocarbamol (ROBAXIN) 500 mg tablet; Take 1 tablet (500 mg total) by mouth 4 (four) times a day  Dispense: 30 tablet; Refill: 0  - meloxicam (MOBIC) 7.5 mg tablet; Take 1 tablet (7.5 mg total) by mouth daily as needed for moderate pain  Dispense: 30 tablet; Refill: 3    2. Mild intermittent asthma, unspecified whether complicated  - albuterol (PROVENTIL HFA,VENTOLIN HFA) 90 mcg/act inhaler; Inhale 2 puffs every 6 (six) hours as needed for wheezing  Dispense: 18 g; Refill: 1           Depression Screening and Follow-up Plan: Patient was screened for depression during today's encounter. They screened negative with a PHQ-2 score of 0. Subjective      Nevin is here for evaluation of back pain, this is recurrent for her approximately 1-2 flares per year depending on activity or exertion. She believes this current episode is secondary to twisting motion while lifting her dog overnight. Reports severe pain this a.m. somewhat debilitating as she was unable to get out of bed. She denies change in bowel or bladder pattern but states sitting on the toilet hurts her back. She denies sciatic involvement. Back Pain  This is a chronic problem. The current episode started yesterday. The problem has been gradually worsening since onset. The pain is present in the lumbar spine. The quality of the pain is described as aching and shooting. The pain does not radiate.  The pain is at a severity of 8/10. The pain is severe. The pain is The same all the time. The symptoms are aggravated by twisting, position, standing and sitting. Review of Systems   Constitutional: Negative. Respiratory: Negative. Gastrointestinal: Negative. Musculoskeletal:  Positive for back pain. Current Outpatient Medications on File Prior to Visit   Medication Sig   • acetaminophen (TYLENOL) 650 mg CR tablet Take 1 tablet (650 mg total) by mouth every 8 (eight) hours as needed for mild pain   • ARIPiprazole (ABILIFY) 15 mg tablet Take 15 mg by mouth daily   • ARIPiprazole (ABILIFY) 20 MG tablet Take 20 mg by mouth daily   • busPIRone (BUSPAR) 30 MG tablet Take 30 mg by mouth 2 (two) times a day   • clonazePAM (KlonoPIN) 1 mg tablet Take 1 mg by mouth 2 (two) times a day   • ibuprofen (MOTRIN) 200 mg tablet Take 2 tablets (400 mg total) by mouth every 6 (six) hours as needed for mild pain   • lamoTRIgine (LaMICtal) 200 MG tablet Take 200 mg by mouth 2 (two) times a day   • lithium carbonate 300 mg capsule Take 300 mg by mouth 3 (three) times a day Pt reported taking 1 tablet AM and 1 tablet PM   • QUEtiapine (SEROquel) 100 mg tablet Take 200 mg by mouth daily at bedtime   • QUEtiapine (SEROquel) 200 mg tablet TAKE 1 TABLET (200 MG) BY ORAL ROUTE BEFORE BED   • traZODone (DESYREL) 50 mg tablet Take 50 mg by mouth daily   • methylPREDNISolone 4 MG tablet therapy pack Use as directed on package (Patient not taking: Reported on 7/13/2023)   • phentermine 30 MG capsule Take 1 capsule (30 mg total) by mouth every morning (Patient not taking: Reported on 7/13/2023)       Objective     /82 (BP Location: Right arm, Patient Position: Sitting, Cuff Size: Large)   Pulse 97   Temp 97.5 °F (36.4 °C) (Tympanic)   Resp 18   Wt (!) 139 kg (306 lb 6.4 oz)   SpO2 97%   BMI 49.45 kg/m²     Physical Exam  Constitutional:       Appearance: Normal appearance.    Cardiovascular:      Rate and Rhythm: Normal rate and regular rhythm. Pulses: Normal pulses. Heart sounds: Normal heart sounds. Pulmonary:      Effort: Pulmonary effort is normal.      Breath sounds: Normal breath sounds. Musculoskeletal:         General: Tenderness present. Skin:     General: Skin is warm and dry. Neurological:      General: No focal deficit present. Mental Status: She is alert. Motor: No weakness.        [facility], [address] [place] [address]

## 2023-10-16 NOTE — LETTER
October 16, 2023     Patient: Ethelle Cogan  YOB: 1990  Date of Visit: 10/16/2023      To Whom it May Concern:    Ethelle Cogan is under my professional care. Rhonda Perez was seen in my office on 10/16/2023. Rhonda Perez may return to work on 10/17/2023 . If you have any questions or concerns, please don't hesitate to call.          Sincerely,          LUZ MARINA Avery        CC: No Recipients

## 2023-11-24 ENCOUNTER — OFFICE VISIT (OUTPATIENT)
Age: 33
End: 2023-11-24
Payer: COMMERCIAL

## 2023-11-24 VITALS
SYSTOLIC BLOOD PRESSURE: 102 MMHG | WEIGHT: 293 LBS | BODY MASS INDEX: 47.09 KG/M2 | OXYGEN SATURATION: 97 % | TEMPERATURE: 97.8 F | HEIGHT: 66 IN | HEART RATE: 85 BPM | DIASTOLIC BLOOD PRESSURE: 60 MMHG

## 2023-11-24 DIAGNOSIS — Q24.9 CARDIAC ABNORMALITY: ICD-10-CM

## 2023-11-24 DIAGNOSIS — Z00.00 ANNUAL PHYSICAL EXAM: Primary | ICD-10-CM

## 2023-11-24 DIAGNOSIS — F41.0 PANIC DISORDER: ICD-10-CM

## 2023-11-24 DIAGNOSIS — Z12.4 CERVICAL CANCER SCREENING: ICD-10-CM

## 2023-11-24 DIAGNOSIS — D15.1 ATRIAL MYXOMA: ICD-10-CM

## 2023-11-24 DIAGNOSIS — E66.01 CLASS 3 SEVERE OBESITY DUE TO EXCESS CALORIES WITH BODY MASS INDEX (BMI) OF 50.0 TO 59.9 IN ADULT, UNSPECIFIED WHETHER SERIOUS COMORBIDITY PRESENT (HCC): ICD-10-CM

## 2023-11-24 DIAGNOSIS — M25.551 RIGHT HIP PAIN: ICD-10-CM

## 2023-11-24 PROCEDURE — 99395 PREV VISIT EST AGE 18-39: CPT

## 2023-11-24 NOTE — PATIENT INSTRUCTIONS
Wellness Visit for Adults   AMBULATORY CARE:   A wellness visit  is when you see your healthcare provider to get screened for health problems. Your healthcare provider will also give you advice on how to stay healthy. Write down your questions so you remember to ask them. Ask your healthcare provider how often you should have a wellness visit. What happens at a wellness visit:  Your healthcare provider will ask about your health, and your family history of health problems. This includes high blood pressure, heart disease, and cancer. He or she will ask if you have symptoms that concern you, if you smoke, and about your mood. You may also be asked about your intake of medicines, supplements, food, and alcohol. Any of the following may be done: Your weight  will be checked. Your height may also be checked so your body mass index (BMI) can be calculated. Your BMI shows if you are at a healthy weight. Your blood pressure  and heart rate will be checked. Your temperature may also be checked. Blood and urine tests  may be done. Blood tests may be done to check your cholesterol levels. Abnormal cholesterol levels increase your risk for heart disease and stroke. You may also need a blood or urine test to check for diabetes if you are at increased risk. Urine tests may be done to look for signs of an infection or kidney disease. A physical exam  includes checking your heartbeat and lungs with a stethoscope. Your healthcare provider may also check your skin to look for sun damage. Screening tests  may be recommended. A screening test is done to check for diseases that may not cause symptoms. The screening tests you may need depend on your age, gender, family history, and lifestyle habits. For example, colorectal screening may be recommended if you are 48years old or older. Screening tests you need if you are a woman:   A Pap smear  is used to screen for cervical cancer.  Pap smears are usually done every 3 to 5 years depending on your age. You may need them more often if you have had abnormal Pap smear test results in the past. Ask your healthcare provider how often you should have a Pap smear. A mammogram  is an x-ray of your breasts to screen for breast cancer. Experts recommend mammograms every 2 years starting at age 48 years. You may need a mammogram at age 52 years or younger if you have an increased risk for breast cancer. Talk to your healthcare provider about when you should start having mammograms and how often you need them. Vaccines you may need:   Get an influenza vaccine  every year. The influenza vaccine protects you from the flu. Several types of viruses cause the flu. The viruses change over time, so new vaccines are made each year. Get a tetanus-diphtheria (Td) booster vaccine  every 10 years. This vaccine protects you against tetanus and diphtheria. Tetanus is a severe infection that may cause painful muscle spasms and lockjaw. Diphtheria is a severe bacterial infection that causes a thick covering in the back of your mouth and throat. Get a human papillomavirus (HPV) vaccine  if you are female and aged 23 to 32 or male 23 to 24 and never received it. This vaccine protects you from HPV infection. HPV is the most common infection spread by sexual contact. HPV may also cause vaginal, penile, and anal cancers. Get a pneumococcal vaccine  if you are aged 72 years or older. The pneumococcal vaccine is an injection given to protect you from pneumococcal disease. Pneumococcal disease is an infection caused by pneumococcal bacteria. The infection may cause pneumonia, meningitis, or an ear infection. Get a shingles vaccine  if you are 60 or older, even if you have had shingles before. The shingles vaccine is an injection to protect you from the varicella-zoster virus. This is the same virus that causes chickenpox.  Shingles is a painful rash that develops in people who had chickenpox or have been exposed to the virus. How to eat healthy:  My Plate is a model for planning healthy meals. It shows the types and amounts of foods that should go on your plate. Fruits and vegetables make up about half of your plate, and grains and protein make up the other half. A serving of dairy is included on the side of your plate. The amount of calories and serving sizes you need depends on your age, gender, weight, and height. Examples of healthy foods are listed below:  Eat a variety of vegetables  such as dark green, red, and orange vegetables. You can also include canned vegetables low in sodium (salt) and frozen vegetables without added butter or sauces. Eat a variety of fresh fruits , canned fruit in 100% juice, frozen fruit, and dried fruit. Include whole grains. At least half of the grains you eat should be whole grains. Examples include whole-wheat bread, wheat pasta, brown rice, and whole-grain cereals such as oatmeal.    Eat a variety of protein foods such as seafood (fish and shellfish), lean meat, and poultry without skin (turkey and chicken). Examples of lean meats include pork leg, shoulder, or tenderloin, and beef round, sirloin, tenderloin, and extra lean ground beef. Other protein foods include eggs and egg substitutes, beans, peas, soy products, nuts, and seeds. Choose low-fat dairy products such as skim or 1% milk or low-fat yogurt, cheese, and cottage cheese. Limit unhealthy fats  such as butter, hard margarine, and shortening. Exercise:  Exercise at least 30 minutes per day on most days of the week. Some examples of exercise include walking, biking, dancing, and swimming. You can also fit in more physical activity by taking the stairs instead of the elevator or parking farther away from stores. Include muscle strengthening activities 2 days each week. Regular exercise provides many health benefits.  It helps you manage your weight, and decreases your risk for type 2 diabetes, heart disease, stroke, and high blood pressure. Exercise can also help improve your mood. Ask your healthcare provider about the best exercise plan for you. General health and safety guidelines:   Do not smoke. Nicotine and other chemicals in cigarettes and cigars can cause lung damage. Ask your healthcare provider for information if you currently smoke and need help to quit. E-cigarettes or smokeless tobacco still contain nicotine. Talk to your healthcare provider before you use these products. Limit alcohol. A drink of alcohol is 12 ounces of beer, 5 ounces of wine, or 1½ ounces of liquor. Lose weight, if needed. Being overweight increases your risk of certain health conditions. These include heart disease, high blood pressure, type 2 diabetes, and certain types of cancer. Protect your skin. Do not sunbathe or use tanning beds. Use sunscreen with a SPF 15 or higher. Apply sunscreen at least 15 minutes before you go outside. Reapply sunscreen every 2 hours. Wear protective clothing, hats, and sunglasses when you are outside. Drive safely. Always wear your seatbelt. Make sure everyone in your car wears a seatbelt. A seatbelt can save your life if you are in an accident. Do not use your cell phone when you are driving. This could distract you and cause an accident. Pull over if you need to make a call or send a text message. Practice safe sex. Use latex condoms if are sexually active and have more than one partner. Your healthcare provider may recommend screening tests for sexually transmitted infections (STIs). Wear helmets, lifejackets, and protective gear. Always wear a helmet when you ride a bike or motorcycle, go skiing, or play sports that could cause a head injury. Wear protective equipment when you play sports. Wear a lifejacket when you are on a boat or doing water sports.     © Copyright [facility] 2023 Information is for End User's use only and may not be sold, redistributed or otherwise used for commercial purposes. The above information is an  only. It is not intended as medical advice for individual conditions or treatments. Talk to your doctor, nurse or pharmacist before following any medical regimen to see if it is safe and effective for you.

## 2023-11-24 NOTE — PROGRESS NOTES
[facility] [place]    NAME: Donaldo Rutledge  AGE: 26 y.o. SEX: female  : 1990     DATE: 2023     Assessment and Plan:     Problem List Items Addressed This Visit          Cardiovascular and Mediastinum    Cardiac abnormality     Pt stable, had surgical intervention. Relevant Orders    CBC and differential    Comprehensive metabolic panel    Lipid panel    Atrial myxoma     Pt had surgery in 2018. Pt states was following with Cardiology, last appointment was last yr. States now scheduled for 5yr follow up. Other    Panic disorder     Pt states has Bipolar, states had to take Clonazepam to come to this appointment. Stable on medication. Follows with Psych every 3 months in [place]. Relevant Orders    TSH, 3rd generation with Free T4 reflex    Class 3 severe obesity due to excess calories in adult [facility])     Pt states she has been trying to lose weight but with no success. Pt has gained almost 50lb since summer. Could be related to eating disorder related to mental health disorders. Encouraged pt to find other healthier ways of coping with stress. Pt wants to follow up with dietician and weight . Referral placed. Relevant Orders    Ambulatory Referral to Weight Management    HEMOGLOBIN A1C W/ EAG ESTIMATION     Other Visit Diagnoses       Annual physical exam    -  Primary    Right hip pain      Pt states has been having hip pain for the last 3-4 weeks has been having right hip pain. Started after she injured her hip, states sometimes it is tolerable, however sometimes just moving her buttocks hurt a lot. Moving her leg hurts. Tenderness reported with palpation. Muscular pain vs nerve pain vs arthritis? Xray ordered and referral to PT placed. Instructed to use heat, tylenol and ibuprofen as needed.     Relevant Orders    Ambulatory Referral to Physical Therapy    XR hip/pelv 2-3 vws right if performed    Cervical cancer screening        Relevant Orders    Ambulatory Referral to Gynecology          Immunizations and preventive care screenings were discussed with patient today. Appropriate education was printed on patient's after visit summary. Counseling:  Alcohol/drug use: discussed moderation in alcohol intake, the recommendations for healthy alcohol use, and avoidance of illicit drug use. Dental Health: discussed importance of regular tooth brushing, flossing, and dental visits. Injury prevention: discussed safety/seat belts, safety helmets, smoke detectors, carbon dioxide detectors, and smoking near bedding or upholstery. Sexual health: discussed sexually transmitted diseases, partner selection, use of condoms, avoidance of unintended pregnancy, and contraceptive alternatives. Exercise: the importance of regular exercise/physical activity was discussed. Recommend exercise 3-5 times per week for at least 30 minutes. Return in about 1 year (around 11/24/2024) for Annual physical.     Chief Complaint:     Chief Complaint   Patient presents with   • Physical Exam     Hip pain      History of Present Illness:     Adult Annual Physical   Patient here for a comprehensive physical exam. The patient reports problems - hip pain and weight gain . Diet and Physical Activity  Diet/Nutrition: heart healthy (low sodium) diet and consuming 3-5 servings of fruits/vegetables daily. Exercise: no formal exercise. Depression Screening  PHQ-2/9 Depression Screening         General Health  Sleep: sleeps well and 6-8hrs . Hearing: normal - bilateral.  Vision: goes for regular eye exams, most recent eye exam <1 year ago, and wears glasses. Dental: no dental visits for >1 year, brushes teeth once daily, and flosses teeth occasionally.        /GYN Health  Follows with gynecology? no   Last menstrual period: 11/09/2023  Contraceptive method:  No .  History of STDs?: no.     Advanced Care Planning  Do you have an advanced directive? no  Do you have a durable medical power of ? no     Review of Systems:     Review of Systems   Constitutional:  Negative for chills and fever. HENT:  Negative for ear pain and sore throat. Eyes:  Negative for pain and visual disturbance. Respiratory:  Negative for cough and shortness of breath. Cardiovascular:  Negative for chest pain and palpitations. Gastrointestinal:  Negative for abdominal pain and vomiting. Genitourinary:  Negative for dysuria and hematuria. Musculoskeletal:  Positive for arthralgias (hip pain). Negative for back pain. Skin:  Negative for color change and rash. Neurological:  Negative for seizures and syncope. Psychiatric/Behavioral:  Positive for dysphoric mood. The patient is nervous/anxious. All other systems reviewed and are negative.      Past Medical History:     Past Medical History:   Diagnosis Date   • Anxiety    • Bipolar 1 disorder ([address]) 2018   • Major depressive disorder 2018   • Panic       Past Surgical History:     Past Surgical History:   Procedure Laterality Date   • ATRIAL MYXOMA EXCISION  09/10/2018   • CHOLECYSTECTOMY  2014   • WART EXCISION        Social History:     Social History     Socioeconomic History   • Marital status: /Civil Union     Spouse name: None   • Number of children: None   • Years of education: None   • Highest education level: None   Occupational History   • None   Tobacco Use   • Smoking status: Former     Types: Cigarettes     Quit date: 2018     Years since quittin.8   • Smokeless tobacco: Never   Vaping Use   • Vaping Use: Some days   • Substances: THC   Substance and Sexual Activity   • Alcohol use: Yes     Comment: occassionaly   • Drug use: Yes     Types: Marijuana     Comment: has medical marjuana card   • Sexual activity: None   Other Topics Concern   • None   Social History Narrative   • None     Social Determinants of Health     Financial Resource Strain: Not on file   Food Insecurity: Not on file   Transportation Needs: Not on file   Physical Activity: Inactive (6/2/2022)    Exercise Vital Sign    • Days of Exercise per Week: 0 days    • Minutes of Exercise per Session: 0 min   Stress: Stress Concern Present (6/2/2022)    [facility]    • Feeling of Stress : Rather much   Social Connections: Not on file   Intimate Partner Violence: Not on file   Housing Stability: Not on file      Family History:     Family History   Problem Relation Age of Onset   • Hypertension Maternal Grandmother    • Cataracts Maternal Grandmother    • Skin cancer Maternal Grandmother    • Diabetes Maternal Grandfather    • Heart block Maternal Grandfather       Current Medications:     Current Outpatient Medications   Medication Sig Dispense Refill   • acetaminophen (TYLENOL) 650 mg CR tablet Take 1 tablet (650 mg total) by mouth every 8 (eight) hours as needed for mild pain 30 tablet 0   • albuterol (PROVENTIL HFA,VENTOLIN HFA) 90 mcg/act inhaler Inhale 2 puffs every 6 (six) hours as needed for wheezing 18 g 1   • ARIPiprazole (ABILIFY) 20 MG tablet Take 20 mg by mouth daily     • busPIRone (BUSPAR) 30 MG tablet Take 30 mg by mouth 2 (two) times a day     • clonazePAM (KlonoPIN) 1 mg tablet Take 1 mg by mouth 2 (two) times a day     • ibuprofen (MOTRIN) 200 mg tablet Take 2 tablets (400 mg total) by mouth every 6 (six) hours as needed for mild pain 30 tablet 0   • lamoTRIgine (LaMICtal) 200 MG tablet Take 200 mg by mouth 2 (two) times a day     • lithium carbonate 300 mg capsule Take 300 mg by mouth 3 (three) times a day Pt reported taking 1 tablet AM and 1 tablet PM     • QUEtiapine (SEROquel) 100 mg tablet Take 200 mg by mouth daily at bedtime     • QUEtiapine (SEROquel) 200 mg tablet TAKE 1 TABLET (200 MG) BY ORAL ROUTE BEFORE BED     • traZODone (DESYREL) 50 mg tablet Take 50 mg by mouth daily 11/24/23 per patient taking 200 mg/sf     • ARIPiprazole (ABILIFY) 15 mg tablet Take 15 mg by mouth daily (Patient not taking: Reported on 11/24/2023)     • meloxicam (MOBIC) 7.5 mg tablet Take 1 tablet (7.5 mg total) by mouth daily as needed for moderate pain (Patient not taking: Reported on 11/24/2023) 30 tablet 3   • methocarbamol (ROBAXIN) 500 mg tablet Take 1 tablet (500 mg total) by mouth 4 (four) times a day (Patient not taking: Reported on 11/24/2023) 30 tablet 0   • methylPREDNISolone 4 MG tablet therapy pack Use as directed on package (Patient not taking: Reported on 7/13/2023) 21 each 0   • phentermine 30 MG capsule Take 1 capsule (30 mg total) by mouth every morning (Patient not taking: Reported on 7/13/2023) 30 capsule 0     No current facility-administered medications for this visit. Allergies: Allergies   Allergen Reactions   • Penicillins Other (See Comments)   • Sulfa Antibiotics Other (See Comments)      Physical Exam:     /60   Pulse 85   Temp 97.8 °F (36.6 °C)   Ht 5' 6" (1.676 m)   Wt (!) 144 kg (316 lb 12.8 oz)   SpO2 97%   BMI 51.13 kg/m²     Physical Exam  Vitals and nursing note reviewed. Constitutional:       General: She is not in acute distress. Appearance: She is well-developed. HENT:      Head: Normocephalic and atraumatic. Right Ear: Tympanic membrane normal.      Left Ear: Tympanic membrane normal.      Nose: Nose normal.      Mouth/Throat:      Mouth: Mucous membranes are moist.   Eyes:      Conjunctiva/sclera: Conjunctivae normal.   Cardiovascular:      Rate and Rhythm: Normal rate and regular rhythm. Heart sounds: Normal heart sounds. No murmur heard. Pulmonary:      Effort: Pulmonary effort is normal. No respiratory distress. Breath sounds: Normal breath sounds. Abdominal:      General: Bowel sounds are normal.      Palpations: Abdomen is soft. Tenderness: There is no abdominal tenderness. Musculoskeletal:         General: No swelling.  Normal range of motion. Cervical back: Neck supple. Skin:     General: Skin is warm and dry. Capillary Refill: Capillary refill takes less than 2 seconds. Neurological:      Mental Status: She is alert and oriented to person, place, and time. Psychiatric:         Mood and Affect: Mood normal.         Behavior: Behavior normal.         Thought Content:  Thought content normal.          Katie Dalal

## 2023-11-24 NOTE — ASSESSMENT & PLAN NOTE
Pt states she has been trying to lose weight but with no success. Pt has gained almost 50lb since summer. Could be related to eating disorder related to mental health disorders. Pt wants to follow up with dietician and weight . Referral placed.

## 2023-11-24 NOTE — ASSESSMENT & PLAN NOTE
Pt states has Bipolar, states had to take Clonazepam to come to this appointment. Stable on medication. Follows with Psych every 3 month in [place].

## 2023-12-02 DIAGNOSIS — J45.20 MILD INTERMITTENT ASTHMA, UNSPECIFIED WHETHER COMPLICATED: ICD-10-CM

## 2023-12-02 RX ORDER — ALBUTEROL SULFATE 90 UG/1
AEROSOL, METERED RESPIRATORY (INHALATION)
Qty: 6.7 G | Refills: 1 | Status: SHIPPED | OUTPATIENT
Start: 2023-12-02

## 2024-01-16 ENCOUNTER — OFFICE VISIT (OUTPATIENT)
Age: 34
End: 2024-01-16
Payer: COMMERCIAL

## 2024-01-16 VITALS
TEMPERATURE: 97.5 F | DIASTOLIC BLOOD PRESSURE: 72 MMHG | RESPIRATION RATE: 18 BRPM | HEART RATE: 100 BPM | SYSTOLIC BLOOD PRESSURE: 114 MMHG | OXYGEN SATURATION: 98 % | BODY MASS INDEX: 53.72 KG/M2 | WEIGHT: 293 LBS

## 2024-01-16 DIAGNOSIS — M70.61 TROCHANTERIC BURSITIS OF RIGHT HIP: Primary | ICD-10-CM

## 2024-01-16 DIAGNOSIS — M25.551 RIGHT HIP PAIN: ICD-10-CM

## 2024-01-16 PROCEDURE — 99213 OFFICE O/P EST LOW 20 MIN: CPT | Performed by: FAMILY MEDICINE

## 2024-01-16 RX ORDER — METHYLPREDNISOLONE 4 MG/1
TABLET ORAL
Qty: 21 EACH | Refills: 0 | Status: SHIPPED | OUTPATIENT
Start: 2024-01-16

## 2024-01-16 NOTE — PROGRESS NOTES
Name: Nevin Dye      : 1990      MRN: [id number]  Encounter Provider: Dilia Gongora DO  Encounter Date: 2024   Encounter department: [facility] [place]    Assessment & Plan     1. Trochanteric bursitis of right hip  2. Right hip pain  -     methylPREDNISolone 4 MG tablet therapy pack; Use as directed on package    Right hip pain possibly multifactorial.  She does have exquisite tenderness over the right trochanteric bursa.  Will provide steroidal inflammatories to help with the pain.  She likely will need PT due to significant ipsilateral piriformis spasm.  Patient does have pending x-rays of the bilateral hips.  Patient will complete and if there is significant pathology will refer to Ortho.  If not she will start PT.           Subjective      Right hip pain x 1 we are. Worse with prolonged walking and bending. Feels like the joint  is locking and freezing due the pain.   Sharp pain.radiates in the the lower glute.  Using Tylenol and Motrin but it is not helping.      Hip Pain           Current Outpatient Medications on File Prior to Visit   Medication Sig    acetaminophen (TYLENOL) 650 mg CR tablet Take 1 tablet (650 mg total) by mouth every 8 (eight) hours as needed for mild pain    albuterol (PROVENTIL HFA,VENTOLIN HFA) 90 mcg/act inhaler INHALE 2 PUFFS EVERY 6 HOURS AS NEEDED FOR WHEEZING    ARIPiprazole (ABILIFY) 20 MG tablet Take 20 mg by mouth daily    busPIRone (BUSPAR) 30 MG tablet Take 30 mg by mouth 2 (two) times a day    clonazePAM (KlonoPIN) 1 mg tablet Take 1 mg by mouth 2 (two) times a day    ibuprofen (MOTRIN) 200 mg tablet Take 2 tablets (400 mg total) by mouth every 6 (six) hours as needed for mild pain    lamoTRIgine (LaMICtal) 200 MG tablet Take 200 mg by mouth 2 (two) times a day    lithium carbonate 300 mg capsule Take 300 mg by mouth 3 (three) times a day Pt reported taking 1 tablet AM and 1 tablet PM    QUEtiapine (SEROquel) 100 mg tablet Take  200 mg by mouth daily at bedtime    QUEtiapine (SEROquel) 200 mg tablet TAKE 1 TABLET (200 MG) BY ORAL ROUTE BEFORE BED    traZODone (DESYREL) 50 mg tablet Take 100 mg by mouth daily 11/24/23 per patient taking 200 mg/sf    ARIPiprazole (ABILIFY) 15 mg tablet Take 15 mg by mouth daily (Patient not taking: Reported on 11/24/2023)    meloxicam (MOBIC) 7.5 mg tablet Take 1 tablet (7.5 mg total) by mouth daily as needed for moderate pain (Patient not taking: Reported on 11/24/2023)    methocarbamol (ROBAXIN) 500 mg tablet Take 1 tablet (500 mg total) by mouth 4 (four) times a day (Patient not taking: Reported on 11/24/2023)    phentermine 30 MG capsule Take 1 capsule (30 mg total) by mouth every morning (Patient not taking: Reported on 7/13/2023)    [DISCONTINUED] methylPREDNISolone 4 MG tablet therapy pack Use as directed on package (Patient not taking: Reported on 7/13/2023)       Objective     /72 (BP Location: Left arm, Patient Position: Sitting, Cuff Size: Large)   Pulse 100   Temp 97.5 °F (36.4 °C) (Temporal)   Resp 18   Wt (!) 151 kg (332 lb 12.8 oz)   SpO2 98%   BMI 53.72 kg/m²     Physical Exam  HENT:      Head: Normocephalic.   Cardiovascular:      Rate and Rhythm: Normal rate.   Pulmonary:      Effort: Pulmonary effort is normal.   Musculoskeletal:      Right hip: Tenderness (Trochanteric bursa) present. Decreased range of motion.      Comments: Point tenderness over the right piriformis   Neurological:      Mental Status: She is alert and oriented to person, place, and time.      Gait: Gait abnormal.   Psychiatric:         Mood and Affect: Mood normal.         Behavior: Behavior normal.       Dilia Gongora,

## 2024-02-06 ENCOUNTER — OFFICE VISIT (OUTPATIENT)
Dept: BARIATRICS | Facility: CLINIC | Age: 34
End: 2024-02-06
Payer: COMMERCIAL

## 2024-02-06 VITALS
WEIGHT: 293 LBS | HEART RATE: 115 BPM | SYSTOLIC BLOOD PRESSURE: 124 MMHG | HEIGHT: 66 IN | BODY MASS INDEX: 47.09 KG/M2 | DIASTOLIC BLOOD PRESSURE: 84 MMHG

## 2024-02-06 DIAGNOSIS — E66.01 CLASS 3 SEVERE OBESITY DUE TO EXCESS CALORIES WITH BODY MASS INDEX (BMI) OF 50.0 TO 59.9 IN ADULT (HCC): Primary | ICD-10-CM

## 2024-02-06 PROCEDURE — 99204 OFFICE O/P NEW MOD 45 MIN: CPT | Performed by: INTERNAL MEDICINE

## 2024-02-06 RX ORDER — METFORMIN HYDROCHLORIDE 500 MG/1
TABLET, EXTENDED RELEASE ORAL
Qty: 90 TABLET | Refills: 3 | Status: SHIPPED | OUTPATIENT
Start: 2024-02-06

## 2024-02-06 NOTE — PROGRESS NOTES
Referred by:   Assessment/Plan     Nevin Dye is 33 y.o. year old female with Above 40- Obesity Class III BMI 52.8 kg/m2 starting weight of 329  lbs on 02/06/24 and obesity related co-morbidities as listed below who comes in for consultation for assistance with weight management.       - Patient is interested in pursuing Conservative Program. Presented the option of bariatric surgery. Patient doesn't want to pursue the option at this time.  Reports concerns of complications and several family members and friends  -  Main drivers of patients elevated body weight -  genetics, excess calories, Physical inactivity, chronic pain, medications, struggle with portion control, cravings, emotional eating, snacking/grazing, inadequate protein leading to poor satiety, poor food quality, and evaluation for VALERY  Diet: Calorie goal: 1800- 2000 calories/day;  - Tried keto in the past and she successfully lost weight on it. Expressed interest in retrying it. Discussed that sustainability and elevated lipids may be a concern.   -Patient is going to consider follow-up with a dietitian but has not decided about this  -Follow general recommendations below.    Physical Activity: As tolerated recommendations below as patient currently has limitations  Sleep: - Stop Bang 4/8; recommended home sleep study and follow up with sleep medicine. Patient is going to check with her insurance. Patient is on high dose of mood stabilizing medications that can be contributing to    Behavioral/Stress: Patient endorses stress eating , snacking grazing and trouble with portion sizes with her underlying anxiety, bipolar disease. recommended that distributing calories evenly throughout the day with adequate protein might help mitigate sx above.    Antiobesity Medications/Medical -Patient meets/does not meet criteria for antiobesity pharmacotherapy therapy  with a BMI ?30 kg/m2,  with comorbidities as above hyperlipidemia  -All options were discussed;  discussed that patient has medication induced weight gain from multiple antipsychotics notably Abilify Seroquel.  -Prescription provided for metformin for medication induced weight gain-however efficacy may be limited given weight loss goals  -Patient has tried phentermine in the past and developed palpitations  -Phentermine Topamax, Qsymia and Contrave-would need some careful monitoring for her mood symptoms if we ever consider these medications;  -Patient has resting tachycardia with a heart rate of 108 so there is some concern of phentermine worsening either anxiety as she also reported palpitations.   - Discussed GLP-1s. Discussed low availability of some injectable GLP-1s at the current time which is subject to change  -Requested patient check with her insurance company if injectables are a covered benefit for them and if preferred pharmacy carries medication.  Provided information regarding availability of savings card on [employer] website if the patient would wish to pursue  -Also counseled that weight regain may occur on stopping medication and it therefore may need to be continued as a weight maintenance medication long term if well tolerated. Patient requested to consider all the  factors outlined below before making an informed decision regarding initiating anti obesity pharmacotherapy.   -Counseled the patient that all AOM's are contraindicated in pregnancy and lactation and should be discontinued if patient were to become pregnant.  -Patient to message me on the portal after they have enquired with insurance about coverage for some of the above medications  -In addition, please follow general recommendations below.      Return visit: 3 months            General Lifestyle recommendations:    Nutrition   -Avoid skipping meals. Avoid sugary beverages. At least 64oz of water daily.  Limit processed food, refined sugars and grain. Encourage  healthy choices for meals and snacks   -Focus on protein goals and  "non starchy fiber rich vegetables for satiety effect and to help support a calorie deficit.   - Emphasize portion control, well balanced macronutrient's (protein, carbohydrate, fat using MyPlate method )and adequate protein with each meal/snacks and distributing calories equally throughout the day along with.   -Advise starting the day with a protein breakfast   Behavioral/Stress   Food log via samra or provided paper log (samra options include www.[employer].com, sparkpeople.com, loseit.com, calorieking.com, Widgetbox). Encouraged mindful eating. Be sure to set aside time to eat, eat slowly, and savor your food. Consider meditation apps and/or taking a few minutes of mindfulness every AM. Understand the role of regarding the role of stress hormone cortisol in promoting weight gain and visceral fat accumulation. Weigh daily or atleast 2-3 times/ week  Physical Activity   Increase physical activity by 10 minutes daily. Gradually increase physical activity to a goal of 5 days per week for 30 minutes of MODERATE intensity ( should be able to pass the \"talk test\" but should not be able to sing. Target 150-300 minutes  PLUS 2 days per week of FULL BODY resistance training. Progression will be addressed at follow up visits. Encouraged contemplation regarding establishing a daily physical activity routine  - Resistance training along with increase protein intake is important to maintain and enhance metabolism  Sleep   Encourage sleep hygiene and importance of having adequate sleep duration at least > 6 hours to support response in weight loss efforts    Handouts provided :  [facility]  MyPlate and food quality  Food log resources, phone samra or paper journal  Antiobesity medications options     - Discussed at length and the role of weight loss medications and medication options   - Explained the importance of making lifestyle changes in addition to starting any anti-obesity medications if the  patient " "chooses.  -  Initial weight loss goal of 5-10% weight loss for improved health  - Weight loss can improve patient's co-morbid conditions and/or prevent weight-related complications.  - Weight is not at goal and patient has been unable to achieve a meaningful weight loss above 5% using various programs and tools for more than 6 months    There are no diagnoses linked to this encounter.                  Total time spent reviewing chart, interviewing patient, examining patient, discussing plan, answering all questions, and documentin min, with >50% face-to-face time spent counseling patient on nonsurgical interventions for the treatment of excess weight. Discussed in detail nonsurgical options including intensive lifestyle intervention program, very low-calorie diet program and conservative program.  Discussed the role of weight loss medications.  Counseled patient on diet behavior and exercise modification for weight loss.            Lifestyle questionnaire     Diet  -Tried keto twice 40 lbs 1st time ( summer   -50 lbs this last summer   - Did well     B-- keto coffees  L--  D -- protein, veggies  S -- snacks throughout day - whatever is PBJ, leftovers  Eating out vs cooking at home- 50/50    Beverages  Water--  not enough   Caffeine/tea--  2 cups   Alcohol-- occasionally SSB- gatorade sugar free    Physical Activity -- not very much, hip hurts, minimal, sedantary job      Sleep -- snores when she lays on her back, doesn't have gasping, day time tired  Stress -- [facility]- psychiatrist- on current medications for 3 years  Smoking- smoked cigarettes for 15 years ; quit in 2018. Myxoma surgery was in   Drug use- smoked pot medicinal use for anxiety and bipolar  Occupation- CPAP specialist deal with   Psycho social- , daughter 16 year old    Onset-- 265 lbs when she had her daughter; dropped  weight ; 180 lbs lowest  Goal Weight - 180 lbs  Food behaviors\"head\" hunger/cravings, stress/emotional eating, " boredom eating, snacking/grazing, and struggle controlling portions- not while on keto; carbs   Gyneac (Menopausal status/periods/contraception)- irregular for the last three months. IUD removed  Fam hx of obesity- GM, mom weighed ? 92 lbs  Medical- tried phentermine 30 mg for 3-4 months - also doing keto; developed palpitations                Weight trajectory     Patient reports and other history-      Wt Readings from Last 20 Encounters:   02/06/24 (!) 150 kg (329 lb 9.6 oz)   01/16/24 (!) 151 kg (332 lb 12.8 oz)   11/24/23 (!) 144 kg (316 lb 12.8 oz)   10/16/23 (!) 139 kg (306 lb 6.4 oz)   07/13/23 122 kg (269 lb)   01/23/23 (!) 159 kg (350 lb 6.4 oz)   01/20/23 112 kg (246 lb 12.8 oz)   11/29/22 109 kg (240 lb 6.4 oz)   08/30/22 122 kg (268 lb)   08/30/22 119 kg (261 lb 11.2 oz)   08/09/22 122 kg (268 lb)   06/02/22 129 kg (284 lb)         Medication considerations/contraindications     -Patient denies personal history of pancreatitis. Patient also denies personal and family history of medullary thyroid cancer and multiple endocrine neoplasia type 2 (MEN 2 tumor). -Patient denies any history of kidney stones, seizures, or glaucoma, diabetic retinopathy, gall bladder disease ( s/p cholecystectomy), hyperthyroidism.  -Denies Hx of CAD, PAD, palpitations, arrhythmia.   -Denies uncontrolled anxiety or depression, suicidal behavior or thinking , insomnia or sleep disturbance.         Past medical history/past surgical history       Previous notes and records have been reviewed.    The following portions of the patient's history were reviewed and updated as appropriate: allergies, current medications, past family history, past medical history, past social history, past surgical history, and problem list.    Past Medical History:   Diagnosis Date    Anxiety     Bipolar 1 disorder (HCC) 2018    Major depressive disorder 2018    Panic          Past Surgical History:   Procedure Laterality Date    ATRIAL MYXOMA EXCISION   "09/10/2018    CHOLECYSTECTOMY  04/2014    WART EXCISION               Family History   Problem Relation Age of Onset    Hypertension Maternal Grandmother     Cataracts Maternal Grandmother     Skin cancer Maternal Grandmother     Diabetes Maternal Grandfather     Heart block Maternal Grandfather             Objective     /84 (BP Location: Left arm, Patient Position: Sitting, Cuff Size: Large)   Pulse (!) 115   Ht 5' 6.25\" (1.683 m)   Wt (!) 150 kg (329 lb 9.6 oz)   LMP 01/29/2024 (Exact Date)   BMI 52.80 kg/m²       Review of Systems   Constitutional:  Negative for fatigue.   HENT:  Negative for sore throat.    Respiratory:  Negative for cough and shortness of breath.         Winded on activity   Cardiovascular:  Negative for chest pain, palpitations and leg swelling.   Gastrointestinal:  Negative for abdominal pain, constipation, diarrhea and nausea.   Genitourinary:  Negative for dysuria.   Musculoskeletal:  Negative for arthralgias and back pain.        Right hip pain     Skin:  Negative for rash.   Neurological:  Negative for headaches.   Psychiatric/Behavioral:  Negative for dysphoric mood. The patient is not nervous/anxious.        Physical Exam  Vitals and nursing note reviewed.   Constitutional:       Appearance: Normal appearance.   HENT:      Head: Normocephalic.   Pulmonary:      Effort: Pulmonary effort is normal.   Neurological:      General: No focal deficit present.      Mental Status: She is alert and oriented to person, place, and time.   Psychiatric:         Mood and Affect: Mood normal.         Behavior: Behavior normal.         Thought Content: Thought content normal.         Judgment: Judgment normal.            Screening       Colonoscopy: N?A  Mammogram: N/A      Medications     []  Current Outpatient Medications:     acetaminophen (TYLENOL) 650 mg CR tablet, Take 1 tablet (650 mg total) by mouth every 8 (eight) hours as needed for mild pain, Disp: 30 tablet, Rfl: 0    albuterol " (PROVENTIL HFA,VENTOLIN HFA) 90 mcg/act inhaler, INHALE 2 PUFFS EVERY 6 HOURS AS NEEDED FOR WHEEZING, Disp: 6.7 g, Rfl: 1    ARIPiprazole (ABILIFY) 20 MG tablet, Take 20 mg by mouth daily, Disp: , Rfl:     busPIRone (BUSPAR) 30 MG tablet, Take 30 mg by mouth 2 (two) times a day, Disp: , Rfl:     clonazePAM (KlonoPIN) 1 mg tablet, Take 1 mg by mouth 2 (two) times a day, Disp: , Rfl:     ibuprofen (MOTRIN) 200 mg tablet, Take 2 tablets (400 mg total) by mouth every 6 (six) hours as needed for mild pain, Disp: 30 tablet, Rfl: 0    lamoTRIgine (LaMICtal) 200 MG tablet, Take 200 mg by mouth 2 (two) times a day, Disp: , Rfl:     lithium carbonate 300 mg capsule, Take 300 mg by mouth 3 (three) times a day Pt reported taking 1 tablet AM and 1 tablet PM, Disp: , Rfl:     QUEtiapine (SEROquel) 100 mg tablet, Take 200 mg by mouth daily at bedtime, Disp: , Rfl:     QUEtiapine (SEROquel) 200 mg tablet, TAKE 1 TABLET (200 MG) BY ORAL ROUTE BEFORE BED, Disp: , Rfl:     traZODone (DESYREL) 50 mg tablet, Take 100 mg by mouth daily 11/24/23 per patient taking 200 mg/sf, Disp: , Rfl:     methocarbamol (ROBAXIN) 500 mg tablet, Take 1 tablet (500 mg total) by mouth 4 (four) times a day (Patient not taking: Reported on 11/24/2023), Disp: 30 tablet, Rfl: 0    methylPREDNISolone 4 MG tablet therapy pack, Use as directed on package, Disp: 21 each, Rfl: 0    phentermine 30 MG capsule, Take 1 capsule (30 mg total) by mouth every morning (Patient not taking: Reported on 7/13/2023), Disp: 30 capsule, Rfl: 0           Labs and imaging     Recent labs and imaging have been personally reviewed.  Lab Results   Component Value Date    WBC 9.84 06/04/2022    HGB 14.6 06/04/2022    HCT 43.4 06/04/2022    MCV 90 06/04/2022     06/04/2022     Lab Results   Component Value Date    SODIUM 141 06/04/2022    K 3.9 06/04/2022     (H) 06/04/2022    CO2 26 06/04/2022    AGAP 4 06/04/2022    BUN 14 06/04/2022    CREATININE 0.96 06/04/2022    GLUF  111 (H) 06/04/2022    CALCIUM 9.4 06/04/2022    AST 12 06/04/2022    ALT 23 06/04/2022    ALKPHOS 86 06/04/2022    TP 7.5 06/04/2022    TBILI 0.41 06/04/2022    EGFR 77 06/04/2022     Lab Results   Component Value Date    HGBA1C 5.2 06/04/2022     Lab Results   Component Value Date    PCP8ZYBTSEFA 1.440 06/04/2022     Lab Results   Component Value Date    CHOLESTEROL 224 (H) 06/04/2022     Lab Results   Component Value Date    HDL 34 (L) 06/04/2022     Lab Results   Component Value Date    TRIG 459 (H) 06/04/2022     Lab Results   Component Value Date    LDLCALC  06/04/2022      Comment:      Calculated LDL invalid, triglycerides >400 mg/dl  This screening LDL is a calculated result.   It does not have the accuracy of the Direct Measured LDL in the monitoring of patients with hyperlipidemia and/or statin therapy.   Direct Measure LDL (OSM617) must be ordered separately in these patients.

## 2024-02-20 ENCOUNTER — TELEPHONE (OUTPATIENT)
Dept: BARIATRICS | Facility: CLINIC | Age: 34
End: 2024-02-20

## 2024-02-20 DIAGNOSIS — E66.01 CLASS 3 SEVERE OBESITY DUE TO EXCESS CALORIES WITH BODY MASS INDEX (BMI) OF 50.0 TO 59.9 IN ADULT (HCC): ICD-10-CM

## 2024-02-20 RX ORDER — METFORMIN HYDROCHLORIDE 500 MG/1
TABLET, EXTENDED RELEASE ORAL
Qty: 180 TABLET | Refills: 0 | Status: SHIPPED | OUTPATIENT
Start: 2024-02-20

## 2024-02-20 NOTE — TELEPHONE ENCOUNTER
Faxed received requesting requesting refill for Metformin HCL  MG Tablet  Quantity 180    Thank you

## 2024-03-18 DIAGNOSIS — J45.20 MILD INTERMITTENT ASTHMA, UNSPECIFIED WHETHER COMPLICATED: ICD-10-CM

## 2024-03-18 RX ORDER — ALBUTEROL SULFATE 90 UG/1
AEROSOL, METERED RESPIRATORY (INHALATION)
Qty: 6.7 G | Refills: 1 | Status: SHIPPED | OUTPATIENT
Start: 2024-03-18

## 2024-03-30 PROBLEM — D15.1 ATRIAL MYXOMA: Status: RESOLVED | Noted: 2023-11-24 | Resolved: 2024-03-30

## 2024-03-30 PROBLEM — Q24.9 CARDIAC ABNORMALITY: Status: RESOLVED | Noted: 2022-06-02 | Resolved: 2024-03-30

## 2024-03-30 PROBLEM — F31.9 BIPOLAR 1 DISORDER (HCC): Status: ACTIVE | Noted: 2024-03-30

## 2024-03-30 PROBLEM — M70.61 TROCHANTERIC BURSITIS OF RIGHT HIP: Status: ACTIVE | Noted: 2024-03-30

## 2024-03-30 PROBLEM — J45.20 MILD INTERMITTENT ASTHMA: Status: ACTIVE | Noted: 2024-03-30

## 2024-04-15 DIAGNOSIS — T14.8XXA MUSCLE STRAIN: ICD-10-CM

## 2024-04-15 RX ORDER — MELOXICAM 7.5 MG/1
7.5 TABLET ORAL DAILY PRN
Qty: 30 TABLET | Refills: 1 | Status: SHIPPED | OUTPATIENT
Start: 2024-04-15

## 2024-05-02 ENCOUNTER — TELEPHONE (OUTPATIENT)
Age: 34
End: 2024-05-02

## 2024-05-02 NOTE — TELEPHONE ENCOUNTER
Patient called in with a message for Dr Cook, she would like to try one of the medications that they spoke about at her last appt.  Could someone please give her a call.  Thanks

## 2024-05-08 ENCOUNTER — OFFICE VISIT (OUTPATIENT)
Dept: BARIATRICS | Facility: CLINIC | Age: 34
End: 2024-05-08
Payer: COMMERCIAL

## 2024-05-08 VITALS
WEIGHT: 293 LBS | DIASTOLIC BLOOD PRESSURE: 72 MMHG | OXYGEN SATURATION: 99 % | SYSTOLIC BLOOD PRESSURE: 118 MMHG | HEIGHT: 66 IN | BODY MASS INDEX: 47.09 KG/M2 | HEART RATE: 103 BPM | RESPIRATION RATE: 18 BRPM

## 2024-05-08 DIAGNOSIS — F31.9 BIPOLAR 1 DISORDER (HCC): ICD-10-CM

## 2024-05-08 DIAGNOSIS — E66.01 CLASS 3 SEVERE OBESITY DUE TO EXCESS CALORIES IN ADULT (HCC): Primary | ICD-10-CM

## 2024-05-08 DIAGNOSIS — E78.2 ELEVATED TRIGLYCERIDES WITH HIGH CHOLESTEROL: ICD-10-CM

## 2024-05-08 DIAGNOSIS — M70.61 TROCHANTERIC BURSITIS OF RIGHT HIP: ICD-10-CM

## 2024-05-08 PROCEDURE — 99213 OFFICE O/P EST LOW 20 MIN: CPT | Performed by: FAMILY MEDICINE

## 2024-05-08 RX ORDER — TRAZODONE HYDROCHLORIDE 100 MG/1
TABLET ORAL
COMMUNITY
Start: 2024-05-01

## 2024-05-08 NOTE — PROGRESS NOTES
Assessment/Plan:  Nevin was seen today for follow-up.    Diagnoses and all orders for this visit:    Class 3 severe obesity due to excess calories in adult (HCC)    Body mass index 50.0-59.9, adult (HCC)    Trochanteric bursitis of right hip    Bipolar 1 disorder (HCC)    Elevated triglycerides with high cholesterol     Will meet behavioral specialist in our clinic   Ask about Topamax - no hx of kidney stones and or glaucoma - will discuss with psyhicatrist Glp-1 discussed - only if TG are under 300   Discussed changes in nutrition composition avoid fatty freid meals  Use proteinshake instead of ice cream etc  Encourage mindful eating, portion control, motivational interview performed to help patient reach goals   Encouraged exercise start 10 min daily goal 150 min weekly      Total time spent: 30 minutes with >50%  face-to-face time spent counseling patient on nonsurgical interventions for the treatment of excess weight.   Discussed the role of weight loss medications.  Counseled patient on diet behavior and exercise modification for weight loss.    Follow up in approximately 2 mo       Subjective:   Chief Complaint   Patient presents with    Follow-up     Patient here to discuss weight associated problems and nutrition goals  HPI: Nevin Dye  is a 33 y.o. female with excess weight/obesity here to pursue weight management.   Wt Readings from Last 10 Encounters:   05/08/24 (!) 161 kg (355 lb 6.4 oz)   02/06/24 (!) 150 kg (329 lb 9.6 oz)   01/16/24 (!) 151 kg (332 lb 12.8 oz)   11/24/23 (!) 144 kg (316 lb 12.8 oz)   10/16/23 (!) 139 kg (306 lb 6.4 oz)   07/13/23 122 kg (269 lb)   01/23/23 (!) 159 kg (350 lb 6.4 oz)   01/20/23 112 kg (246 lb 12.8 oz)   11/29/22 109 kg (240 lb 6.4 oz)   08/30/22 122 kg (268 lb)       Initial weight:2/2024 329lbs  Current weight:355lbs     Increased appetite/cravings:boredom eating  Poor eating pattern skips B      Exercise:poor due to joint pain  The following portions of the  "patient's history were reviewed and updated as appropriate: allergies, current medications, past family history, past medical history, past social history, past surgical history, and problem list.      Review of Systems   Constitutional: Negative for activity change. Fatigue  Gastrointestinal: Negative for abdominal pain, nausea and vomiting, acid reflux, constipation/diarrhea  Psychiatric/Behavioral: Negative for behavioral problems , anxiety or depression    Objective:  Pulse 103   Resp 18   Ht 5' 6.2\" (1.681 m)   Wt (!) 161 kg (355 lb 6.4 oz)   SpO2 99%   BMI 57.02 kg/m²   Constitutional: Well-developed, well-nourished and Obese Body mass index is 57.02 kg/m²..  HEENT: No conjunctival pallor or jaundice.  Pulmonary: No increased work of breathing or signs of respiratory distress.  CV: well perfused, no edema  GI: Obese. Non-distended   Neuro: Oriented to person, place and time. Normal Speech. Normal gait.  Psych: Normal affect and mood. Normal thought process no delusions   Labs and Imaging  Recent labs and imaging have been personally reviewed.  Lab Results   Component Value Date    WBC 9.84 06/04/2022    HGB 14.6 06/04/2022    HCT 43.4 06/04/2022    MCV 90 06/04/2022     06/04/2022     Lab Results   Component Value Date    SODIUM 141 06/04/2022    K 3.9 06/04/2022     (H) 06/04/2022    CO2 26 06/04/2022    AGAP 4 06/04/2022    BUN 14 06/04/2022    CREATININE 0.96 06/04/2022    GLUF 111 (H) 06/04/2022    CALCIUM 9.4 06/04/2022    AST 12 06/04/2022    ALT 23 06/04/2022    ALKPHOS 86 06/04/2022    TP 7.5 06/04/2022    TBILI 0.41 06/04/2022    EGFR 77 06/04/2022     Lab Results   Component Value Date    HGBA1C 5.2 06/04/2022     Lab Results   Component Value Date    KBP8LEZYPAOB 1.440 06/04/2022     Lab Results   Component Value Date    CHOLESTEROL 224 (H) 06/04/2022     Lab Results   Component Value Date    HDL 34 (L) 06/04/2022     Lab Results   Component Value Date    TRIG 459 (H) 06/04/2022 "     Lab Results   Component Value Date    LDLCALC  06/04/2022      Comment:      Calculated LDL invalid, triglycerides >400 mg/dl  This screening LDL is a calculated result.   It does not have the accuracy of the Direct Measured LDL in the monitoring of patients with hyperlipidemia and/or statin therapy.   Direct Measure LDL (BSD665) must be ordered separately in these patients.

## 2024-05-13 DIAGNOSIS — E66.01 CLASS 3 SEVERE OBESITY DUE TO EXCESS CALORIES WITH BODY MASS INDEX (BMI) OF 50.0 TO 59.9 IN ADULT (HCC): ICD-10-CM

## 2024-05-13 RX ORDER — METFORMIN HYDROCHLORIDE 500 MG/1
TABLET, EXTENDED RELEASE ORAL
Qty: 180 TABLET | Refills: 0 | Status: SHIPPED | OUTPATIENT
Start: 2024-05-13

## 2024-10-03 ENCOUNTER — TELEPHONE (OUTPATIENT)
Age: 34
End: 2024-10-03

## 2024-10-15 ENCOUNTER — OFFICE VISIT (OUTPATIENT)
Age: 34
End: 2024-10-15
Payer: COMMERCIAL

## 2024-10-15 VITALS
BODY MASS INDEX: 56.95 KG/M2 | HEART RATE: 81 BPM | SYSTOLIC BLOOD PRESSURE: 131 MMHG | WEIGHT: 293 LBS | OXYGEN SATURATION: 96 % | RESPIRATION RATE: 18 BRPM | DIASTOLIC BLOOD PRESSURE: 60 MMHG

## 2024-10-15 DIAGNOSIS — R07.82 INTERCOSTAL PAIN: Primary | ICD-10-CM

## 2024-10-15 PROCEDURE — G0382 LEV 3 HOSP TYPE B ED VISIT: HCPCS

## 2024-10-15 RX ORDER — TOPIRAMATE 100 MG/1
150 TABLET, FILM COATED ORAL DAILY
COMMUNITY
Start: 2024-07-09

## 2024-10-15 NOTE — PATIENT INSTRUCTIONS
A heart attack cannot fully be ruled out in the urgent care setting without additional workup. If chest pain symptoms do not improve, at any time worsen, are associated with sweating, dizziness, radiation down the arm, or shortness of breath call 911 and go to the emergency room for further workup.

## 2024-10-15 NOTE — PROGRESS NOTES
[facility]    NAME: Nevin Dye is a 34 y.o. female  : 1990    MRN: [id number]  DATE: October 15, 2024  TIME: 6:24 PM    Assessment and Plan   Intercostal pain [R07.82]  1. Intercostal pain          12 Lead EKG completed showing - normal sinus rhythm, no ST elevations  Educated pt that a heart attack cannot fully be ruled out in the urgent care setting without additional workup. If chest pain symptoms do not improve, at any time worsen, are associated with sweating, radiation down the arm, or shortness of breath call 911 and go to the emergency room for further workup.  With only minimal symptoms and normal EKG -- okay to go home and monitor.   Follow up with primary care in 3-5 days.  Go to ER if symptoms get worse.     Patient Instructions     A heart attack cannot fully be ruled out in the urgent care setting without additional workup. If chest pain symptoms do not improve, at any time worsen, are associated with sweating, dizziness, radiation down the arm, or shortness of breath call 911 and go to the emergency room for further workup.      Chief Complaint     Chief Complaint   Patient presents with    Chest Pain     Pt states she has been having on and off chest pain throughout the day. Chest pain is described as sharp. Pt feels chest pain when she coughs occasionally. Pt denies headaches, dizziness, blurred vision, loss of balance. Pt has hx of heart tumor.          History of Present Illness       Presents with chest pain that started today. Symptoms are intermittent and sharp. Worse with occasionally coughing. Pt denies headaches, dizziness, blurred vision, loss of balance. Pt has hx of heart tumor. PHM includes asthma, panic disorder, bipolar 1 disorder, and major depressive disorder. Pt has medication list noted on file including: seroquel, Clonazepam, buspar, lamictal, ability, and trazadone.  Follows up with psych every 3 months per last note. Symptoms worse with certain positions.          Review of Systems   Review of Systems   Constitutional:  Negative for chills and fever.   HENT:  Negative for congestion and sore throat.    Respiratory:  Negative for cough and shortness of breath.    Cardiovascular:  Positive for chest pain.   Gastrointestinal:  Negative for abdominal pain.   Musculoskeletal:  Negative for myalgias.   Psychiatric/Behavioral:  Negative for confusion.          Current Medications       Current Outpatient Medications:     acetaminophen (TYLENOL) 650 mg CR tablet, Take 1 tablet (650 mg total) by mouth every 8 (eight) hours as needed for mild pain, Disp: 30 tablet, Rfl: 0    albuterol (PROVENTIL HFA,VENTOLIN HFA) 90 mcg/act inhaler, TAKE 2 PUFFS BY MOUTH EVERY 6 HOURS AS NEEDED FOR WHEEZE, Disp: 6.7 g, Rfl: 1    clonazePAM (KlonoPIN) 1 mg tablet, Take 1 mg by mouth 2 (two) times a day, Disp: , Rfl:     ibuprofen (MOTRIN) 200 mg tablet, Take 2 tablets (400 mg total) by mouth every 6 (six) hours as needed for mild pain, Disp: 30 tablet, Rfl: 0    lamoTRIgine (LaMICtal) 200 MG tablet, Take 200 mg by mouth 2 (two) times a day, Disp: , Rfl:     QUEtiapine (SEROquel) 200 mg tablet, TAKE 1 TABLET (200 MG) BY ORAL ROUTE BEFORE BED, Disp: , Rfl:     topiramate (TOPAMAX) 100 mg tablet, Take 150 mg by mouth daily, Disp: , Rfl:     traZODone (DESYREL) 100 mg tablet, TAKE 1-3 TABLET(S) ORAL EVERY DAY, Disp: , Rfl:     ARIPiprazole (ABILIFY) 20 MG tablet, Take 20 mg by mouth daily, Disp: , Rfl:     busPIRone (BUSPAR) 30 MG tablet, Take 30 mg by mouth 2 (two) times a day, Disp: , Rfl:     lithium carbonate 300 mg capsule, Take 300 mg by mouth 2 (two) times a day with meals Pt reported taking 1 tablet AM and 1 tablet PM (Patient not taking: Reported on 10/15/2024), Disp: , Rfl:     meloxicam (MOBIC) 7.5 mg tablet, TAKE 1 TABLET (7.5 MG TOTAL) BY MOUTH DAILY AS NEEDED FOR MODERATE PAIN (Patient not taking: Reported on 10/15/2024), Disp: 30 tablet, Rfl: 1    metFORMIN (GLUCOPHAGE-XR) 500 mg 24  hr tablet, START 1 TABLET BY MOUTH DAILY WITH THE EVENING MEAL INCREASE IN TWO WEEKS TO 2 TABLETS IF TOLERATED (Patient not taking: Reported on 10/15/2024), Disp: 180 tablet, Rfl: 0    methocarbamol (ROBAXIN) 500 mg tablet, Take 1 tablet (500 mg total) by mouth 4 (four) times a day (Patient not taking: Reported on 11/24/2023), Disp: 30 tablet, Rfl: 0    QUEtiapine (SEROquel) 100 mg tablet, Take 200 mg by mouth daily at bedtime (Patient not taking: Reported on 10/15/2024), Disp: , Rfl:     Current Allergies     Allergies as of 10/15/2024 - Reviewed 10/15/2024   Allergen Reaction Noted    Penicillins Other (See Comments) 02/18/2020    Sulfa antibiotics Other (See Comments) 02/18/2020            The following portions of the patient's history were reviewed and updated as appropriate: allergies, current medications, past family history, past medical history, past social history, past surgical history and problem list.     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    Surgery 2018 per note dated 11/24/2023    Bipolar 1 disorder (HCC) 2018    Cardiac abnormality 06/02/2022    Surgery, now stable, per note dated 11/24/2023    Major depressive disorder 2018    Panic        Past Surgical History:   Procedure Laterality Date    ATRIAL MYXOMA EXCISION  09/10/2018    CHOLECYSTECTOMY  04/2014    WART EXCISION         Family History   Problem Relation Age of Onset    Hypertension Maternal Grandmother     Cataracts Maternal Grandmother     Skin cancer Maternal Grandmother     Diabetes Maternal Grandfather     Heart block Maternal Grandfather          Medications have been verified.        Objective   /60   Pulse 81   Resp 18   Wt (!) 161 kg (355 lb)   SpO2 96%   BMI 56.95 kg/m²        Physical Exam     Physical Exam  Vitals reviewed.   Constitutional:       Appearance: Normal appearance.   Cardiovascular:      Rate and Rhythm: Normal rate and regular rhythm.      Pulses: Normal  pulses.      Heart sounds: Normal heart sounds. No murmur heard.  Pulmonary:      Effort: Pulmonary effort is normal. No respiratory distress.      Breath sounds: Normal breath sounds.   Skin:     General: Skin is warm and dry.      Capillary Refill: Capillary refill takes less than 2 seconds.   Neurological:      General: No focal deficit present.      Mental Status: She is alert and oriented to person, place, and time.   Psychiatric:         Mood and Affect: Mood normal.         Behavior: Behavior normal.

## 2024-10-25 ENCOUNTER — OFFICE VISIT (OUTPATIENT)
Age: 34
End: 2024-10-25
Payer: COMMERCIAL

## 2024-10-25 ENCOUNTER — APPOINTMENT (OUTPATIENT)
Age: 34
End: 2024-10-25
Payer: COMMERCIAL

## 2024-10-25 VITALS
TEMPERATURE: 97.7 F | RESPIRATION RATE: 16 BRPM | DIASTOLIC BLOOD PRESSURE: 60 MMHG | BODY MASS INDEX: 47.09 KG/M2 | HEART RATE: 95 BPM | HEIGHT: 66 IN | SYSTOLIC BLOOD PRESSURE: 110 MMHG | WEIGHT: 293 LBS | OXYGEN SATURATION: 98 %

## 2024-10-25 DIAGNOSIS — Z13.220 ENCOUNTER FOR LIPID SCREENING FOR CARDIOVASCULAR DISEASE: ICD-10-CM

## 2024-10-25 DIAGNOSIS — G43.119 INTRACTABLE MIGRAINE WITH AURA WITHOUT STATUS MIGRAINOSUS: ICD-10-CM

## 2024-10-25 DIAGNOSIS — R25.2 MUSCLE CRAMP: ICD-10-CM

## 2024-10-25 DIAGNOSIS — R73.01 IMPAIRED FASTING GLUCOSE: ICD-10-CM

## 2024-10-25 DIAGNOSIS — E66.813 CLASS 3 SEVERE OBESITY DUE TO EXCESS CALORIES WITH BODY MASS INDEX (BMI) OF 50.0 TO 59.9 IN ADULT, UNSPECIFIED WHETHER SERIOUS COMORBIDITY PRESENT (HCC): ICD-10-CM

## 2024-10-25 DIAGNOSIS — R07.9 CHEST PAIN, UNSPECIFIED TYPE: Primary | ICD-10-CM

## 2024-10-25 DIAGNOSIS — Z13.6 ENCOUNTER FOR LIPID SCREENING FOR CARDIOVASCULAR DISEASE: ICD-10-CM

## 2024-10-25 DIAGNOSIS — E66.01 CLASS 3 SEVERE OBESITY DUE TO EXCESS CALORIES WITH BODY MASS INDEX (BMI) OF 50.0 TO 59.9 IN ADULT, UNSPECIFIED WHETHER SERIOUS COMORBIDITY PRESENT (HCC): ICD-10-CM

## 2024-10-25 LAB
ALBUMIN SERPL BCG-MCNC: 3.8 G/DL (ref 3.5–5)
ALP SERPL-CCNC: 71 U/L (ref 34–104)
ALT SERPL W P-5'-P-CCNC: 13 U/L (ref 7–52)
ANION GAP SERPL CALCULATED.3IONS-SCNC: 10 MMOL/L (ref 4–13)
AST SERPL W P-5'-P-CCNC: 11 U/L (ref 13–39)
BASOPHILS # BLD AUTO: 0.04 THOUSANDS/ΜL (ref 0–0.1)
BASOPHILS NFR BLD AUTO: 0 % (ref 0–1)
BILIRUB SERPL-MCNC: 0.4 MG/DL (ref 0.2–1)
BUN SERPL-MCNC: 15 MG/DL (ref 5–25)
CALCIUM SERPL-MCNC: 8.7 MG/DL (ref 8.4–10.2)
CHLORIDE SERPL-SCNC: 106 MMOL/L (ref 96–108)
CHOLEST SERPL-MCNC: 191 MG/DL
CO2 SERPL-SCNC: 22 MMOL/L (ref 21–32)
CREAT SERPL-MCNC: 0.87 MG/DL (ref 0.6–1.3)
EOSINOPHIL # BLD AUTO: 0.16 THOUSAND/ΜL (ref 0–0.61)
EOSINOPHIL NFR BLD AUTO: 2 % (ref 0–6)
ERYTHROCYTE [DISTWIDTH] IN BLOOD BY AUTOMATED COUNT: 13.3 % (ref 11.6–15.1)
EST. AVERAGE GLUCOSE BLD GHB EST-MCNC: 111 MG/DL
GFR SERPL CREATININE-BSD FRML MDRD: 85 ML/MIN/1.73SQ M
GLUCOSE P FAST SERPL-MCNC: 102 MG/DL (ref 65–99)
HBA1C MFR BLD: 5.5 %
HCT VFR BLD AUTO: 40.5 % (ref 34.8–46.1)
HDLC SERPL-MCNC: 37 MG/DL
HGB BLD-MCNC: 13.1 G/DL (ref 11.5–15.4)
IMM GRANULOCYTES # BLD AUTO: 0.04 THOUSAND/UL (ref 0–0.2)
IMM GRANULOCYTES NFR BLD AUTO: 0 % (ref 0–2)
LYMPHOCYTES # BLD AUTO: 2.07 THOUSANDS/ΜL (ref 0.6–4.47)
LYMPHOCYTES NFR BLD AUTO: 23 % (ref 14–44)
MAGNESIUM SERPL-MCNC: 2 MG/DL (ref 1.9–2.7)
MCH RBC QN AUTO: 30.1 PG (ref 26.8–34.3)
MCHC RBC AUTO-ENTMCNC: 32.3 G/DL (ref 31.4–37.4)
MCV RBC AUTO: 93 FL (ref 82–98)
MONOCYTES # BLD AUTO: 0.61 THOUSAND/ΜL (ref 0.17–1.22)
MONOCYTES NFR BLD AUTO: 7 % (ref 4–12)
NEUTROPHILS # BLD AUTO: 6.03 THOUSANDS/ΜL (ref 1.85–7.62)
NEUTS SEG NFR BLD AUTO: 68 % (ref 43–75)
NONHDLC SERPL-MCNC: 154 MG/DL
NRBC BLD AUTO-RTO: 0 /100 WBCS
PLATELET # BLD AUTO: 297 THOUSANDS/UL (ref 149–390)
PMV BLD AUTO: 10.6 FL (ref 8.9–12.7)
POTASSIUM SERPL-SCNC: 4.1 MMOL/L (ref 3.5–5.3)
PROT SERPL-MCNC: 6.5 G/DL (ref 6.4–8.4)
RBC # BLD AUTO: 4.35 MILLION/UL (ref 3.81–5.12)
SODIUM SERPL-SCNC: 138 MMOL/L (ref 135–147)
TRIGL SERPL-MCNC: 446 MG/DL
WBC # BLD AUTO: 8.95 THOUSAND/UL (ref 4.31–10.16)

## 2024-10-25 PROCEDURE — 36415 COLL VENOUS BLD VENIPUNCTURE: CPT

## 2024-10-25 PROCEDURE — 99214 OFFICE O/P EST MOD 30 MIN: CPT

## 2024-10-25 PROCEDURE — 85025 COMPLETE CBC W/AUTO DIFF WBC: CPT

## 2024-10-25 PROCEDURE — 83735 ASSAY OF MAGNESIUM: CPT

## 2024-10-25 PROCEDURE — 83036 HEMOGLOBIN GLYCOSYLATED A1C: CPT

## 2024-10-25 PROCEDURE — 80053 COMPREHEN METABOLIC PANEL: CPT

## 2024-10-25 PROCEDURE — 80061 LIPID PANEL: CPT

## 2024-10-25 RX ORDER — SUMATRIPTAN 25 MG/1
25 TABLET, FILM COATED ORAL ONCE AS NEEDED
Qty: 30 TABLET | Refills: 1 | Status: SHIPPED | OUTPATIENT
Start: 2024-10-25

## 2024-10-25 NOTE — PROGRESS NOTES
Ambulatory Visit  Name: Nevin Dye      : 1990      MRN: [id number]  Encounter Provider: Debbie Draper PA-C  Encounter Date: 10/25/2024   Encounter department: [facility] PRIMARY [facility] [place]    Assessment & Plan  Chest pain, unspecified type  Now resolved.  Recommend the patient follow-up with us if the chest pain recurs or becomes more frequent and I can place referral to cardiology.  Patient demonstrates understanding and is in agreement with the plan.       Muscle cramp  Recommend checking basic blood work and magnesium level to look for etiology of muscle cramping.  I will follow-up regarding results.  Recommend she take Tylenol or Advil as needed for pain, try warm compresses and massage of the muscles.  Orders:    Magnesium; Future    CBC and differential    Comprehensive metabolic panel    Intractable migraine with aura without status migrainosus  Will prescribe sumatriptan 25 mg to be taken at the onset of a migraine.  Patient may take a second tablet 2 hours after the first as needed to further reduce symptoms.  Follow-up if symptoms worsen or fail to improve.  Orders:    SUMAtriptan (Imitrex) 25 mg tablet; Take 1 tablet (25 mg total) by mouth once as needed for migraine for up to 60 doses    CBC and differential    Comprehensive metabolic panel    Encounter for lipid screening for cardiovascular disease  Will check basic blood work including cholesterol and A1c and blood counts and have the patient follow-up in the office in 1 month for her annual physical and to discuss results.  Orders:    Lipid panel    Impaired fasting glucose    Orders:    Hemoglobin A1C; Future    Class 3 severe obesity due to excess calories with body mass index (BMI) of 50.0 to 59.9 in adult, unspecified whether serious comorbidity present (HCC)      Orders:    CBC and differential    Comprehensive metabolic panel       History of Present Illness     Nevin is a 34-year-old female presenting to the office for  follow-up on chest pains for which she saw urgent care last week, migraine headaches, and muscle cramps.  She had chest pain about 2 days last week and went to urgent care for evaluation.  EKG was done and was normal.  She has not had the chest pain since.  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  She complains of migraine type headaches that occur about 3 times a month.  She will typically get pain on 1 side of her head, frequently of the right side.  They are not frequent, but when she does get them they are very severe and she will take Excedrin, coffee, and try cold and warm compresses, but at times they are so severe that they will last for 3 days before resolving.  She gets photophobia with her migraines and finds benefit sitting in a dark quiet room.  She will get some blurry vision with her migraines as well.  She complains of muscle cramps in her bilateral legs, neck, and abdomen that occur at random times for the past few months, but tend to occur more at night and with movement of the legs.  She has some swelling of her bilateral ankles, but no sudden increase or change in swelling.          Migraine  Pertinent negatives include no abdominal pain, back pain, coughing, ear pain, eye pain, fever, seizures, sore throat or vomiting.         Review of Systems   Constitutional:  Negative for chills and fever.   HENT:  Negative for ear pain and sore throat.    Eyes:  Negative for pain and visual disturbance.   Respiratory:  Negative for cough and shortness of breath.    Cardiovascular:  Positive for chest pain (Twice last week, since resolved). Negative for palpitations.   Gastrointestinal:  Negative for abdominal pain and vomiting.   Genitourinary:  Negative for dysuria and hematuria.   Musculoskeletal:  Positive for myalgias (Muscle cramping). Negative for  "arthralgias and back pain.   Skin:  Negative for color change and rash.   Neurological:  Positive for headaches. Negative for seizures and syncope.   All other systems reviewed and are negative.          Objective     /60 (BP Location: Left arm, Patient Position: Sitting, Cuff Size: Standard)   Pulse 95   Temp 97.7 °F (36.5 °C) (Tympanic)   Resp 16   Ht 5' 6.25\" (1.683 m)   Wt (!) 161 kg (354 lb 9.6 oz)   SpO2 98%   BMI 56.80 kg/m²     Physical Exam  Vitals and nursing note reviewed.   Constitutional:       General: She is not in acute distress.     Appearance: She is well-developed.   HENT:      Head: Normocephalic and atraumatic.      Right Ear: Tympanic membrane normal.      Left Ear: Tympanic membrane normal.      Nose: Nose normal.      Mouth/Throat:      Mouth: Mucous membranes are moist.      Pharynx: Oropharynx is clear. No oropharyngeal exudate.   Eyes:      Extraocular Movements: Extraocular movements intact.      Conjunctiva/sclera: Conjunctivae normal.   Cardiovascular:      Rate and Rhythm: Normal rate and regular rhythm.      Heart sounds: Normal heart sounds. No murmur heard.     No friction rub. No gallop.   Pulmonary:      Effort: Pulmonary effort is normal. No respiratory distress.      Breath sounds: Normal breath sounds. No wheezing, rhonchi or rales.   Abdominal:      Palpations: Abdomen is soft.      Tenderness: There is no abdominal tenderness.   Musculoskeletal:         General: No swelling.      Cervical back: Neck supple.      Right lower leg: No swelling or tenderness.      Left lower leg: No swelling or tenderness.      Right ankle: Swelling present. No tenderness. Normal range of motion.      Left ankle: Swelling present. No tenderness. Normal range of motion.      Comments: Mild swelling of the bilateral ankles, but no pitting edema.  No tenderness to palpation of the ankles or calves.   Skin:     General: Skin is warm and dry.      Capillary Refill: Capillary refill takes " less than 2 seconds.   Neurological:      General: No focal deficit present.      Mental Status: She is alert.   Psychiatric:         Mood and Affect: Mood normal.

## 2024-10-29 ENCOUNTER — TELEPHONE (OUTPATIENT)
Age: 34
End: 2024-10-29

## 2024-10-29 DIAGNOSIS — R25.2 MUSCLE CRAMP: Primary | ICD-10-CM

## 2024-10-29 NOTE — TELEPHONE ENCOUNTER
It is not just calf cramping it is all over her body and she has like horses in her stomach and neck... she said you can send her a [employer] message with what you recommend she do. Thank you

## 2024-10-29 NOTE — TELEPHONE ENCOUNTER
----- Message from Debbie Draper PA-C sent at 10/29/2024 11:22 AM EDT -----  Lab work is stable and magnesium level is normal. Recommend muscle massage and gentle stretching for calf cramping.

## 2025-01-02 ENCOUNTER — TELEPHONE (OUTPATIENT)
Age: 35
End: 2025-01-02

## 2025-01-02 NOTE — TELEPHONE ENCOUNTER
Contacted patient off of Medication Management  to verify needs of services in attempts to offer patient an appointment. LVM for patient to contact intake dept  in regards to wait list

## 2025-01-03 ENCOUNTER — TELEPHONE (OUTPATIENT)
Age: 35
End: 2025-01-03

## 2025-01-03 NOTE — TELEPHONE ENCOUNTER
"Behavioral Health Outpatient Intake Questions    Referred By   : Self Referral     Please advise interviewee that they need to answer all questions truthfully to allow for best care, and any misrepresentations of information may affect their ability to be seen at this clinic   => Was this discussed? Yes     If Minor Child (under age 18)    Who is/are the legal guardian(s) of the child? N/A    Is there a custody agreement?  N/A    If \"YES\"- Custody orders must be obtained prior to scheduling the first appointment  In addition, Consent to Treatment must be signed by all legal guardians prior to scheduling the first appointment    If \"NO\"- Consent to Treatment must be signed by all legal guardians prior to scheduling the first appointment    Behavioral Health Outpatient Intake History -     Presenting Problem (in patient's own words): Patient has anxiety bipolar and anxiety disorder    Are there any communication barriers for this patient?     No                                               If yes, please describe barriers:   If there is a unique situation, please refer to Franco Gomez/Amanda Witt for final determination.    Are you taking any psychiatric medications? Yes     If \"YES\" -What are they Lamictal, Abilify, Seroquel, Klonopin, Buspar, Topomax, Trazadone    If \"YES\" -Who prescribes? [facility] in [place]    Has the Patient previously received outpatient Talk Therapy or Medication Management from [facility] NO         If \"YES\"- When, Where and with Whom?         If \"NO\" -Has Patient received these services elsewhere?       If \"YES\" -When, Where, and with Whom? [facility] in [place]    Has the Patient abused alcohol or other substances in the last 6 months ? No       If \"YES\" -What substance, How much, How often?     If illegal substance: Refer to [facility] (for RICHARD) or [facility].   If Alcohol in excess of 10 drinks per week:  Refer to [facility] (for RICHARD) or [facility]    Legal " "History-     Is this treatment court ordered? No   If \"yes \"send to :  Talk Therapy : Send to Franco Gomez for final determination   Med Management: Send to Dr. Barron for final determination     Has the Patient been convicted of a felony?  NO   If \"Yes\" send to -When, What?  Talk Therapy: Send to Franco Gomez for final determination   Med Management: Send to Dr. Barron for final determination     ACCEPTED as a patient Yes  If \"Yes\" Appointment Date: 3/31/25 at 1:30 with Giorgi Ascencio    Referred Elsewhere? No  If “Yes” - (Where? Ex: [facility], [facility], [facility], [facility], etc.)       Name of Insurance Co:[facility]  Insurance ID# [id number]  Insurance Phone # [phone]  If ins is primary or secondary?Primary   If patient is a minor, parents information such as Name, D.O.B of guarantor.  NP forms sent via [email or web address]  "

## 2025-01-09 ENCOUNTER — TELEPHONE (OUTPATIENT)
Dept: PSYCHIATRY | Facility: CLINIC | Age: 35
End: 2025-01-09

## 2025-01-09 NOTE — TELEPHONE ENCOUNTER
One week follow up call for New Patient appointment with Giorgi Barron [[id number]] on 3/12/25  was made on 1/3/25. Writer informed patient of New Patient paperwork needing to be completed 5 days prior to the appointment. Writer confirmed paperwork has been sent via My Chart.

## 2025-01-13 ENCOUNTER — OFFICE VISIT (OUTPATIENT)
Age: 35
End: 2025-01-13
Payer: COMMERCIAL

## 2025-01-13 VITALS
WEIGHT: 293 LBS | DIASTOLIC BLOOD PRESSURE: 62 MMHG | HEART RATE: 88 BPM | SYSTOLIC BLOOD PRESSURE: 141 MMHG | RESPIRATION RATE: 18 BRPM | OXYGEN SATURATION: 98 % | BODY MASS INDEX: 58.63 KG/M2 | TEMPERATURE: 97.6 F

## 2025-01-13 DIAGNOSIS — B34.9 PHARYNGITIS WITH VIRAL SYNDROME: Primary | ICD-10-CM

## 2025-01-13 DIAGNOSIS — J02.9 PHARYNGITIS WITH VIRAL SYNDROME: Primary | ICD-10-CM

## 2025-01-13 PROCEDURE — S9083 URGENT CARE CENTER GLOBAL: HCPCS | Performed by: PHYSICIAN ASSISTANT

## 2025-01-13 PROCEDURE — G0382 LEV 3 HOSP TYPE B ED VISIT: HCPCS | Performed by: PHYSICIAN ASSISTANT

## 2025-01-13 RX ORDER — BROMPHENIRAMINE MALEATE, PSEUDOEPHEDRINE HYDROCHLORIDE, AND DEXTROMETHORPHAN HYDROBROMIDE 2; 30; 10 MG/5ML; MG/5ML; MG/5ML
5 SYRUP ORAL 4 TIMES DAILY PRN
Qty: 120 ML | Refills: 0 | Status: SHIPPED | OUTPATIENT
Start: 2025-01-13

## 2025-01-13 NOTE — PROGRESS NOTES
[facility]        NAME: Nevin Dye is a 34 y.o. female  : 1990    MRN: [id number]  DATE: 2025  TIME: 6:23 PM    Assessment and Plan   Pharyngitis with viral syndrome [J02.9, B34.9]  1. Pharyngitis with viral syndrome  brompheniramine-pseudoephedrine-DM 30-2-10 MG/5ML syrup            Patient Instructions     Viral URI  Cough syrup as directed  Tylenol  Warm water gargles with salt    Follow up with PCP in 3-5 days.  Proceed to  ER if symptoms worsen.      Chief Complaint     Chief Complaint   Patient presents with    Sore Throat     Pt states she has a cough, headache, irritated throat, b/l earache, and N/V starting today.          History of Present Illness       Pt states she has a cough, headache, irritated throat, b/l earache, and N/V starting today.        Cough  This is a new problem. The current episode started today. The problem has been unchanged. The problem occurs every few hours. The cough is Non-productive. Associated symptoms include postnasal drip, rhinorrhea and a sore throat. Pertinent negatives include no chills, fever, headaches, myalgias or rash. The symptoms are aggravated by lying down. She has tried OTC cough suppressant for the symptoms. The treatment provided no relief. Her past medical history is significant for bronchitis.       Review of Systems   Review of Systems   Constitutional:  Negative for activity change, appetite change, chills, fatigue and fever.   HENT:  Positive for congestion, postnasal drip, rhinorrhea and sore throat.    Respiratory:  Positive for cough.    Gastrointestinal:  Positive for nausea and vomiting. Negative for abdominal pain.   Musculoskeletal:  Negative for myalgias.   Skin:  Negative for rash.   Neurological:  Negative for dizziness, light-headedness and headaches.         Current Medications       Current Outpatient Medications:     acetaminophen (TYLENOL) 650 mg CR tablet, Take 1 tablet (650 mg total) by mouth every 8  (eight) hours as needed for mild pain, Disp: 30 tablet, Rfl: 0    albuterol (PROVENTIL HFA,VENTOLIN HFA) 90 mcg/act inhaler, TAKE 2 PUFFS BY MOUTH EVERY 6 HOURS AS NEEDED FOR WHEEZE, Disp: 6.7 g, Rfl: 1    ARIPiprazole (ABILIFY) 20 MG tablet, Take 20 mg by mouth daily, Disp: , Rfl:     brompheniramine-pseudoephedrine-DM 30-2-10 MG/5ML syrup, Take 5 mL by mouth 4 (four) times a day as needed for congestion, Disp: 120 mL, Rfl: 0    busPIRone (BUSPAR) 30 MG tablet, Take 30 mg by mouth 2 (two) times a day, Disp: , Rfl:     clonazePAM (KlonoPIN) 1 mg tablet, Take 1 mg by mouth 2 (two) times a day, Disp: , Rfl:     ibuprofen (MOTRIN) 200 mg tablet, Take 2 tablets (400 mg total) by mouth every 6 (six) hours as needed for mild pain, Disp: 30 tablet, Rfl: 0    lamoTRIgine (LaMICtal) 200 MG tablet, Take 200 mg by mouth 2 (two) times a day, Disp: , Rfl:     QUEtiapine (SEROquel) 200 mg tablet, TAKE 1 TABLET (200 MG) BY ORAL ROUTE BEFORE BED, Disp: , Rfl:     SUMAtriptan (Imitrex) 25 mg tablet, Take 1 tablet (25 mg total) by mouth once as needed for migraine for up to 60 doses, Disp: 30 tablet, Rfl: 1    topiramate (TOPAMAX) 100 mg tablet, Take 150 mg by mouth daily, Disp: , Rfl:     traZODone (DESYREL) 100 mg tablet, TAKE 1-3 TABLET(S) ORAL EVERY DAY, Disp: , Rfl:     Current Allergies     Allergies as of 01/13/2025 - Reviewed 01/13/2025   Allergen Reaction Noted    Penicillins Other (See Comments) 02/18/2020    Sulfa antibiotics Other (See Comments) 02/18/2020            The following portions of the patient's history were reviewed and updated as appropriate: allergies, current medications, past family history, past medical history, past social history, past surgical history and problem list.     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    Surgery 2018 per note dated 11/24/2023    Bipolar 1 disorder (HCC) 2018    Cardiac abnormality 06/02/2022    Surgery, now stable, per note dated  11/24/2023    Major depressive disorder 2018    Panic        Past Surgical History:   Procedure Laterality Date    ATRIAL MYXOMA EXCISION  09/10/2018    CHOLECYSTECTOMY  04/2014    WART EXCISION         Family History   Problem Relation Age of Onset    Hypertension Maternal Grandmother     Cataracts Maternal Grandmother     Skin cancer Maternal Grandmother     Diabetes Maternal Grandfather     Heart block Maternal Grandfather          Medications have been verified.        Objective   /62   Pulse 88   Temp 97.6 °F (36.4 °C)   Resp 18   Wt (!) 166 kg (366 lb)   SpO2 98%   BMI 58.63 kg/m²        Physical Exam     Physical Exam  Vitals and nursing note reviewed.   Constitutional:       General: She is not in acute distress.     Appearance: Normal appearance. She is well-developed and normal weight. She is not ill-appearing, toxic-appearing or diaphoretic.   HENT:      Head: Normocephalic and atraumatic.      Right Ear: Tympanic membrane, ear canal and external ear normal.      Left Ear: Tympanic membrane, ear canal and external ear normal.      Nose: Congestion and rhinorrhea present.      Mouth/Throat:      Mouth: Mucous membranes are moist. Oral lesions present.      Pharynx: Oropharynx is clear. Posterior oropharyngeal erythema present. No pharyngeal swelling, oropharyngeal exudate or uvula swelling.      Tonsils: No tonsillar exudate or tonsillar abscesses. 0 on the right. 0 on the left.   Eyes:      General: No scleral icterus.     Extraocular Movements: Extraocular movements intact.      Right eye: Normal extraocular motion.      Left eye: Normal extraocular motion.      Conjunctiva/sclera: Conjunctivae normal.      Pupils: Pupils are equal, round, and reactive to light.   Neck:      Thyroid: No thyromegaly.   Cardiovascular:      Rate and Rhythm: Normal rate and regular rhythm.      Pulses: Normal pulses.      Heart sounds: Normal heart sounds.   Pulmonary:      Effort: Pulmonary effort is normal. No  respiratory distress.      Breath sounds: Normal breath sounds.   Musculoskeletal:         General: Normal range of motion.      Cervical back: Normal range of motion and neck supple. No tenderness.   Lymphadenopathy:      Cervical: No cervical adenopathy.   Skin:     General: Skin is warm and dry.      Capillary Refill: Capillary refill takes less than 2 seconds.      Findings: No rash.   Neurological:      General: No focal deficit present.      Mental Status: She is alert and oriented to person, place, and time.      Coordination: Coordination normal.      Gait: Gait normal.   Psychiatric:         Mood and Affect: Mood normal.         Behavior: Behavior normal.

## 2025-01-13 NOTE — PATIENT INSTRUCTIONS
"Patient Education     Cough, runny nose, and the common cold   The Basics   Written by the doctors and editors at [facility]   What causes cough, runny nose, and other symptoms of the common cold? -- These symptoms are usually caused by a virus. Doctors also use the term \"viral upper respiratory infection\" or \"viral URI.\" Lots of different viruses can get into your nose, mouth, throat, or airways and cause cold symptoms.  Most people get better from a cold without any lasting problems. Even so, having a cold can be uncomfortable.  What are the symptoms of the common cold? -- Symptoms can include:   Sneezing   Coughing   Sniffling and runny nose   Sore throat   Chest congestion  In children, the common cold can also cause a fever. But adults do not usually get a fever when they have a cold.  Colds usually last about 3 to 7 days in adults and 10 days in children. But some people have symptoms for up to 2 weeks.  How can I tell if I have a cold or something else? -- Sometimes, it can be hard to tell if you have a cold or something else. Some cold symptoms can also be caused by other illnesses, such as COVID-19, the flu, or strep throat.  There are sometimes clues that can help you tell the difference:   COVID-19 often starts out very similar to a cold, although it can also cause a fever. If you have cold symptoms and have been around someone with COVID-19, you should get a test to find out if you have it, too.   The flu is more likely to cause fever, body aches, and extreme tiredness than a cold.   Strep throat usually causes severe throat pain. It can also cause a fever and swollen glands in the neck. People with strep throat usually do not have other cold symptoms like a stuffy nose or cough.  If you think that you might have an illness other than the common cold, call your doctor or nurse. They can tell you what to do.  Can medicine help with a cold? -- Usually, a cold gets better on its own and does not need " treatment. Because colds are usually caused by viruses, antibiotics will not help.  If you are a teen or an adult, you can try cough and cold medicines that you can get without a prescription. These medicines might help with your symptoms. But they can't cure your cold, or help you get well faster.  If you decide to try non-prescription cold medicines:   Read the directions on the label, and follow them carefully.   Do not combine 2 or more medicines that have acetaminophen in them. If you take too much acetaminophen, it can damage your liver.   If you have a heart condition, have high blood pressure, or take any prescription medicines, talk to your doctor or pharmacist before taking cold medicine. They can tell you which medicines are safe.  Some medicines are not safe for children:   If your child is younger than 6, do not give them any cold medicines. These medicines are not safe for young children. Even if your child is older than 6, cough and cold medicines are unlikely to help.   Never give aspirin to any child younger than 18 years old. In children, aspirin can cause a life-threatening condition called Reye syndrome.   When giving your child acetaminophen or other non-prescription medicines, never give more than the recommended dose.  Is there anything I can do on my own to feel better? -- Yes. You can:   Get plenty of rest.   Drink lots of fluids (water, juice, or broth) to stay hydrated. This will help replace any fluids lost if you have a runny nose or sweating from a fever. Warm tea or soup can help soothe a sore throat.   If the air in your home feels dry, use a cool-mist humidifier. This can help a stuffy nose and make it easier to breathe.   Use saline nose drops or spray to relieve stuffiness.   Avoid smoking, and stay away from places where people are smoking.  Can the common cold lead to more serious problems? -- In some cases, yes. In some people, having a cold can lead to:   Ear infections   Worse  asthma symptoms   Sinus infections   Pneumonia or bronchitis (infections of the lungs)  Can colds be prevented? -- There are some things you can do to keep germs from spreading:   Wash your hands with soap and water often (figure 1) - This can also help prevent the spread of other illnesses like the flu and COVID-19.   Cover your cough - Cough into your elbow instead of your hands. Teach children to do this, too. Throw away used tissues right away.   Clean surfaces - The germs that cause the common cold can live on tables, door handles, and other surfaces for at least 2 hours.   Stay home if you are sick - When you do need to be around other people, consider wearing a face mask until you are feeling better.  When should I call the doctor? -- Contact your doctor or nurse if you:   Lose your sense of taste or smell   Have a fever of more than 100.4°F (38°C) that comes with shaking chills, loss of appetite, or trouble breathing   Have a very bad sore throat   Have a fever and also have lung disease, such as emphysema or asthma   Have a cough that lasts longer than 10 days or starts getting worse   Have chest pain when you cough or breathe deeply, have trouble breathing, or cough up blood  If you are older than 65, or if you have any chronic medical conditions such as diabetes, contact your doctor or nurse any time you get a long-lasting cough.  Take your child to the emergency department if they:   Become confused or stop responding to you   Have trouble breathing or have to work hard to breathe  Contact your child's doctor or nurse if the child:   Loses their sense of taste or smell or won't eat foods that they ate before   Has a very bad sore throat   Refuses to drink anything for a long time   Is younger than 4 months   Has a fever and is not acting like themselves   Has a cough that lasts for more than 2 weeks and is not getting any better or is getting worse   Has a stuffed or runny nose that gets worse or does  not get any better after 10 days   Has red eyes or yellow goop coming out of their eyes   Has ear pain, pulls at their ears, or shows other signs of having an ear infection  All topics are updated as new evidence becomes available and our peer review process is complete.  This topic retrieved from [employer] on: Feb 26, 2024.  Topic 57049 Version 30.0  Release: 32.2.4 - C32.56  © 2024 UpToDate, Inc. and/or its affiliates. All rights reserved.  figure 1: How to wash your hands     Wet your hands with clean water, and apply a small amount of soap. Lather and rub hands together for at least 20 seconds. Clean your wrists, palms, backs of your hands, between your fingers, tips of your fingers, thumbs, and under and around your nails. Rinse well, and dry your hands using a clean towel.  Graphic 821701 Version 7.0  Consumer Information Use and Disclaimer   Disclaimer: This generalized information is a limited summary of diagnosis, treatment, and/or medication information. It is not meant to be comprehensive and should be used as a tool to help the user understand and/or assess potential diagnostic and treatment options. It does NOT include all information about conditions, treatments, medications, side effects, or risks that may apply to a specific patient. It is not intended to be medical advice or a substitute for the medical advice, diagnosis, or treatment of a health care provider based on the health care provider's examination and assessment of a patient's specific and unique circumstances. Patients must speak with a health care provider for complete information about their health, medical questions, and treatment options, including any risks or benefits regarding use of medications. This information does not endorse any treatments or medications as safe, effective, or approved for treating a specific patient. UpToDate, Inc. and its affiliates disclaim any warranty or liability relating to this information or the use  thereof.The use of this information is governed by the Terms of Use, available at https://www.wolterskluwer.com/en/know/clinical-effectiveness-terms. 2024© UpToDate, Inc. and its affiliates and/or licensors. All rights reserved.  Copyright   © 2024 [employer] and/or its affiliates. All rights reserved.

## 2025-01-31 ENCOUNTER — OFFICE VISIT (OUTPATIENT)
Age: 35
End: 2025-01-31
Payer: COMMERCIAL

## 2025-01-31 ENCOUNTER — APPOINTMENT (OUTPATIENT)
Age: 35
End: 2025-01-31
Payer: COMMERCIAL

## 2025-01-31 ENCOUNTER — RESULTS FOLLOW-UP (OUTPATIENT)
Age: 35
End: 2025-01-31

## 2025-01-31 VITALS
RESPIRATION RATE: 18 BRPM | TEMPERATURE: 96.9 F | DIASTOLIC BLOOD PRESSURE: 78 MMHG | HEART RATE: 101 BPM | WEIGHT: 293 LBS | OXYGEN SATURATION: 97 % | SYSTOLIC BLOOD PRESSURE: 132 MMHG | HEIGHT: 66 IN | BODY MASS INDEX: 47.09 KG/M2

## 2025-01-31 DIAGNOSIS — M53.3 COCCYX PAIN: ICD-10-CM

## 2025-01-31 DIAGNOSIS — H65.192 OTHER NON-RECURRENT ACUTE NONSUPPURATIVE OTITIS MEDIA OF LEFT EAR: Primary | ICD-10-CM

## 2025-01-31 PROCEDURE — 99213 OFFICE O/P EST LOW 20 MIN: CPT

## 2025-01-31 PROCEDURE — 72220 X-RAY EXAM SACRUM TAILBONE: CPT

## 2025-01-31 RX ORDER — AZITHROMYCIN 250 MG/1
TABLET, FILM COATED ORAL
Qty: 6 TABLET | Refills: 0 | Status: SHIPPED | OUTPATIENT
Start: 2025-01-31 | End: 2025-02-05

## 2025-01-31 NOTE — PROGRESS NOTES
Name: Nevin Dye      : 1990      MRN: [id number]  Encounter Provider: Debbie Draper PA-C  Encounter Date: 2025   Encounter department: [facility]  :  Assessment & Plan  Other non-recurrent acute nonsuppurative otitis media of left ear  Patient has penicillin allergy and is uncertain if she has ever had cephalosporins.  She had whole body hives with penicillins.  We will trial secondary antibiotic azithromycin for ear infection.  Patient had success with azithromycin for ear infections in the past.  Instructed the patient to follow-up early next week if symptoms worsen or fail to improve.  She may take Advil or Tylenol as needed for pain.  Orders:  •  azithromycin (Zithromax) 250 mg tablet; Take 2 tablets (500 mg total) by mouth daily for 1 day, THEN 1 tablet (250 mg total) daily for 4 days.    Coccyx pain  Patient notes pain with sitting for the past couple weeks.  History of tailbone injury when 11 years old.  Will check x-ray to look for any evidence of acute or historic fracture.  I will follow-up regarding results.  Orders:  •  XR sacrum and coccyx; Future           History of Present Illness   Nevin Dye is a 34-year-old female presenting to the office with complaint of left ear pain and pressure since having COVID 2 weeks ago.  Her right ear feels clogged, but less pain in the right ear.  She has tried taking Sudafed with no improvement in her symptoms.  Her COVID symptoms have since resolved and she denies any current cough, sinus pain or pressure.  She has not used eardrops or Q-tips in her ears.  She has taken Advil as needed for the pain.  She used to get frequent ear infections when she was a kid for which they would give her a Z-Andre because she has a penicillin allergy.  Z-Andre tended to improve her symptoms.  She also notes that her tailbone has been hurting her when sitting for the past couple of weeks.  No recent injury, although when she was 11 years old  "she fell down a couple of steps on the bleachers and had significant tailbone pain for some time after that, although was never evaluated or had imaging.      Review of Systems   Constitutional:  Negative for chills and fever.   HENT:  Positive for ear pain. Negative for congestion, ear discharge, sinus pressure, sinus pain and sore throat.    Eyes:  Negative for pain and visual disturbance.   Respiratory:  Negative for cough and shortness of breath.    Cardiovascular:  Negative for chest pain and palpitations.   Gastrointestinal:  Negative for abdominal pain, constipation, diarrhea and vomiting.   Genitourinary:  Negative for dysuria and hematuria.   Musculoskeletal:  Negative for arthralgias, back pain and myalgias.   Skin:  Negative for color change and rash.   Neurological:  Negative for dizziness, seizures, syncope and headaches.   All other systems reviewed and are negative.      Objective   /78 (BP Location: Left arm, Patient Position: Sitting, Cuff Size: Large)   Pulse 101   Temp (!) 96.9 °F (36.1 °C) (Tympanic)   Resp 18   Ht 5' 6.25\" (1.683 m)   Wt (!) 170 kg (373 lb 12.8 oz)   SpO2 97%   BMI 59.88 kg/m²      Physical Exam  Vitals and nursing note reviewed.   Constitutional:       General: She is not in acute distress.     Appearance: She is well-developed.   HENT:      Head: Normocephalic and atraumatic.      Right Ear: Tympanic membrane, ear canal and external ear normal. No drainage or swelling. No middle ear effusion. There is no impacted cerumen. Tympanic membrane is not scarred, perforated, erythematous, retracted or bulging.      Left Ear: Ear canal and external ear normal. No drainage or swelling. There is no impacted cerumen. Tympanic membrane is erythematous and bulging. Tympanic membrane is not scarred or perforated.      Nose: Nose normal.      Mouth/Throat:      Mouth: Mucous membranes are moist.      Pharynx: Oropharynx is clear. No oropharyngeal exudate or posterior " oropharyngeal erythema.   Eyes:      Extraocular Movements: Extraocular movements intact.      Conjunctiva/sclera: Conjunctivae normal.   Cardiovascular:      Rate and Rhythm: Normal rate and regular rhythm.   Pulmonary:      Effort: Pulmonary effort is normal. No respiratory distress.   Musculoskeletal:         General: No swelling.      Cervical back: Neck supple.   Skin:     General: Skin is warm and dry.      Capillary Refill: Capillary refill takes less than 2 seconds.   Neurological:      General: No focal deficit present.      Mental Status: She is alert.   Psychiatric:         Mood and Affect: Mood normal.

## 2025-02-04 DIAGNOSIS — H92.02 LEFT EAR PAIN: Primary | ICD-10-CM

## 2025-03-05 ENCOUNTER — OFFICE VISIT (OUTPATIENT)
Age: 35
End: 2025-03-05
Payer: COMMERCIAL

## 2025-03-05 VITALS
SYSTOLIC BLOOD PRESSURE: 128 MMHG | WEIGHT: 293 LBS | BODY MASS INDEX: 47.09 KG/M2 | RESPIRATION RATE: 18 BRPM | DIASTOLIC BLOOD PRESSURE: 76 MMHG | OXYGEN SATURATION: 96 % | TEMPERATURE: 97.9 F | HEART RATE: 104 BPM | HEIGHT: 66 IN

## 2025-03-05 DIAGNOSIS — B00.89 HERPETIC WHITLOW: ICD-10-CM

## 2025-03-05 DIAGNOSIS — M25.551 RIGHT HIP PAIN: Primary | ICD-10-CM

## 2025-03-05 DIAGNOSIS — F31.9 BIPOLAR 1 DISORDER (HCC): ICD-10-CM

## 2025-03-05 PROCEDURE — 99214 OFFICE O/P EST MOD 30 MIN: CPT

## 2025-03-05 RX ORDER — VALACYCLOVIR HYDROCHLORIDE 1 G/1
2000 TABLET, FILM COATED ORAL 2 TIMES DAILY
Qty: 4 TABLET | Refills: 0 | Status: SHIPPED | OUTPATIENT
Start: 2025-03-05 | End: 2025-03-06

## 2025-03-05 NOTE — PROGRESS NOTES
Name: Nevin Dye      : 1990      MRN: [id number]  Encounter Provider: Debbie Draper PA-C  Encounter Date: 3/5/2025   Encounter department: [facility] PRIMARY CARE [place]  :  Assessment & Plan  Right hip pain  Will check x-ray of the right hip for further evaluation.  Recommend the patient switch from ibuprofen to Tylenol for pain relief to reduce risk of stomach ulcers, bleeding, kidney injury.  X-ray results will help guide our management.  Discussed with the patient that if there are no significant abnormalities on x-ray, we may recommend follow-up with physical therapy for evaluation and management.  Our other option is follow-up with orthopedics.  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  Will treat with a 1 day course of valacyclovir to help reduce the course of illness.  Recommend she keep a lesion covered with a Band-Aid to prevent spread of infection.  Orders:  •  valACYclovir (VALTREX) 1,000 mg tablet; Take 2 tablets (2,000 mg total) by mouth 2 (two) times a day for 1 day    Bipolar 1 disorder (HCC)  Stable.  Continue current medications as prescribed.              History of Present Illness   Nevin is a 34-year-old female presenting to the office with a complaint of blistering type rash on her right hand and right hip pain.  The rash developed a couple days ago and is slightly itchy.  She states it is appearing similar to the herpetic jerome type rash that she has gotten on her fingers in the past.  She also has a history of HSV cold sores on the lips, but no current lip lesions.  No history of genital ulcers.  She has had right hip pain for some time that is worse in the morning and with movement after prolonged sitting.  She has been taking ibuprofen 800 mg twice daily for pain relief with  "some improvement in symptoms.  She has also tried lidocaine creams without improvement in symptoms.  When she stands up and starts to walk she feels that her hip and leg locks for a few steps before she is able to get moving.        Review of Systems   Constitutional:  Negative for chills and fever.   HENT:  Negative for congestion, ear pain and sore throat.    Eyes:  Negative for pain and visual disturbance.   Respiratory:  Negative for cough and shortness of breath.    Cardiovascular:  Negative for chest pain and palpitations.   Gastrointestinal:  Negative for abdominal pain, constipation, diarrhea and vomiting.   Genitourinary:  Negative for dysuria and hematuria.   Musculoskeletal:  Positive for arthralgias (Right hip pain). Negative for back pain and myalgias.   Skin:  Positive for rash. Negative for color change.   Neurological:  Negative for dizziness, seizures, syncope and headaches.   All other systems reviewed and are negative.      Objective   /76 (BP Location: Right arm, Patient Position: Sitting, Cuff Size: Standard)   Pulse 104   Temp 97.9 °F (36.6 °C) (Tympanic)   Resp 18   Ht 5' 6.25\" (1.683 m)   Wt (!) 170 kg (374 lb)   SpO2 96%   BMI 59.91 kg/m²      Physical Exam  Vitals and nursing note reviewed.   Constitutional:       General: She is not in acute distress.     Appearance: She is well-developed.   HENT:      Head: Normocephalic and atraumatic.      Mouth/Throat:      Mouth: Mucous membranes are moist.      Pharynx: Oropharynx is clear. No oropharyngeal exudate.   Eyes:      Extraocular Movements: Extraocular movements intact.      Conjunctiva/sclera: Conjunctivae normal.   Cardiovascular:      Rate and Rhythm: Normal rate and regular rhythm.      Heart sounds: Normal heart sounds. No murmur heard.     No friction rub. No gallop.   Pulmonary:      Effort: Pulmonary effort is normal. No respiratory distress.      Breath sounds: Normal breath sounds. No wheezing, rhonchi or rales. "   Abdominal:      Palpations: Abdomen is soft.      Tenderness: There is no abdominal tenderness.   Musculoskeletal:         General: No swelling.      Cervical back: Neck supple.      Comments: Gait is slow/cautious secondary to hip pain   Skin:     General: Skin is warm and dry.      Capillary Refill: Capillary refill takes less than 2 seconds.   Neurological:      General: No focal deficit present.      Mental Status: She is alert. Mental status is at baseline.   Psychiatric:         Mood and Affect: Mood normal.         Behavior: Behavior normal.

## 2025-03-20 ENCOUNTER — OFFICE VISIT (OUTPATIENT)
Age: 35
End: 2025-03-20
Payer: COMMERCIAL

## 2025-03-20 VITALS
OXYGEN SATURATION: 98 % | BODY MASS INDEX: 47.09 KG/M2 | TEMPERATURE: 97.4 F | WEIGHT: 293 LBS | SYSTOLIC BLOOD PRESSURE: 122 MMHG | HEART RATE: 126 BPM | HEIGHT: 66 IN | RESPIRATION RATE: 16 BRPM | DIASTOLIC BLOOD PRESSURE: 78 MMHG

## 2025-03-20 DIAGNOSIS — F31.9 BIPOLAR 1 DISORDER (HCC): ICD-10-CM

## 2025-03-20 DIAGNOSIS — J45.20 MILD INTERMITTENT ASTHMA, UNSPECIFIED WHETHER COMPLICATED: ICD-10-CM

## 2025-03-20 DIAGNOSIS — M25.551 RIGHT HIP PAIN: Primary | ICD-10-CM

## 2025-03-20 PROCEDURE — 99214 OFFICE O/P EST MOD 30 MIN: CPT

## 2025-03-20 RX ORDER — CARISOPRODOL 350 MG/1
350 TABLET ORAL 3 TIMES DAILY PRN
Qty: 30 TABLET | Refills: 0 | Status: SHIPPED | OUTPATIENT
Start: 2025-03-20

## 2025-03-20 NOTE — ASSESSMENT & PLAN NOTE
Discussed that lamictal, seroquel, and topamax in combination with Soma can increase risk of CNS depression. Benefits and risks of the therapy discussed and patient instructed to take sparingly and stop use if she experiences side effects or difficulty breathing.

## 2025-03-20 NOTE — PROGRESS NOTES
Name: Nevin Dye      : 1990      MRN: [id number]  Encounter Provider: Debbie Draper PA-C  Encounter Date: 3/20/2025   Encounter department: [facility] CARE [place]  :  Assessment & Plan  Right hip pain  Xray of right hip unremarkable. Ddx includes trochanteric bursitis, muscle/tendon strain, avascular necrosis of hip. Suspect trochanteric bursitis. Patient has history of trochanteric bursitis of the right hip noted in chart from . Recommend follow up with orthopedics given the severity of her pain for further evaluation and management. Prescribed Soma to be taken as needed for severe pain. Discussedc that Soma is a schedule IV drug with habit forming potential and risk of CNS depression if taken in access. Instructed her to take sparingly as needed for severe pain until she can see orthopedics and to avoid driving or operating machinary while on Soma. Go to the ER if pain or symptoms worsen.   Orders:  •  carisoprodol (SOMA) 350 mg tablet; Take 1 tablet (350 mg total) by mouth 3 (three) times a day as needed for muscle spasms (moderate to severe pain)  •  Ambulatory Referral to Orthopedic Surgery; Future    Mild intermittent asthma, unspecified whether complicated  Stable. Continue albuterol as needed.        Bipolar 1 disorder (HCC)  Discussed that lamictal, seroquel, and topamax in combination with Soma can increase risk of CNS depression. Benefits and risks of the therapy discussed and patient instructed to take sparingly and stop use if she experiences side effects or difficulty breathing.               History of Present Illness   Nevin Dye presents for follow up of right hip pain. She got the hip xray done 2 days ago, and findings were overall normal. She is still experiencing severe intermittent sharp pain of the right hip that started about a month ago. Pain is worse during first few steps of walking after sitting and with coughing or laughing. She feels like the hip  "\"freezes\" when she starts walking. She has chronic low back pain, but denies numbness or tingling down her right leg. She has tried ibuprofen and meloxicam with minimal relief of symptoms. Tylenol provided some relief. She has tried flexeril for pain in the past and states it did nothing for her, but she has had success with Soma muscle relaxer in the past.           Review of Systems   Constitutional:  Negative for chills and fever.   HENT:  Negative for congestion, ear pain and sore throat.    Eyes:  Negative for pain and visual disturbance.   Respiratory:  Negative for cough and shortness of breath.    Cardiovascular:  Negative for chest pain and palpitations.   Gastrointestinal:  Negative for abdominal pain, constipation, diarrhea and vomiting.   Genitourinary:  Negative for dysuria and hematuria.   Musculoskeletal:  Positive for arthralgias (right hip), gait problem (secondary to right hip pain) and myalgias (right hip). Negative for back pain.   Skin:  Negative for color change and rash.   Neurological:  Negative for dizziness, seizures, syncope and headaches.   All other systems reviewed and are negative.      Objective   /78 (BP Location: Left arm, Patient Position: Sitting)   Pulse (!) 126   Temp (!) 97.4 °F (36.3 °C) (Tympanic)   Resp 16   Ht 5' 6.25\" (1.683 m)   Wt (!) 169 kg (373 lb 3.2 oz)   SpO2 98%   BMI 59.78 kg/m²      Physical Exam  Vitals and nursing note reviewed.   Constitutional:       General: She is not in acute distress.     Appearance: She is well-developed.   Eyes:      Extraocular Movements: Extraocular movements intact.      Conjunctiva/sclera: Conjunctivae normal.   Cardiovascular:      Rate and Rhythm: Normal rate and regular rhythm.      Heart sounds: Normal heart sounds. No murmur heard.     No friction rub. No gallop.   Pulmonary:      Effort: Pulmonary effort is normal. No respiratory distress.      Breath sounds: Normal breath sounds. No wheezing, rhonchi or rales. "   Musculoskeletal:         General: No swelling.      Right hip: Tenderness present. Normal range of motion.      Left hip: No tenderness. Normal range of motion.      Comments: TTP of right lateral and posterior aspect of hip / gluteal region.   Range of motion intact, but positive for pain in right hip.    Skin:     General: Skin is warm and dry.      Capillary Refill: Capillary refill takes less than 2 seconds.   Neurological:      General: No focal deficit present.      Mental Status: She is alert. Mental status is at baseline.   Psychiatric:         Mood and Affect: Mood normal.         Behavior: Behavior normal.       Administrative Statements   I have spent a total time of 40 minutes in caring for this patient on the day of the visit/encounter including Diagnostic results, Prognosis, Risks and benefits of tx options, Instructions for management, Patient and family education, Importance of tx compliance, Impressions, Documenting in the medical record, Reviewing/placing orders in the medical record (including tests, medications, and/or procedures), Obtaining or reviewing history  , and Communicating with other healthcare professionals .

## 2025-03-31 ENCOUNTER — OFFICE VISIT (OUTPATIENT)
Dept: PSYCHIATRY | Facility: CLINIC | Age: 35
End: 2025-03-31
Payer: COMMERCIAL

## 2025-03-31 DIAGNOSIS — F43.10 POST TRAUMATIC STRESS DISORDER (PTSD): ICD-10-CM

## 2025-03-31 DIAGNOSIS — F41.0 PANIC DISORDER: Primary | ICD-10-CM

## 2025-03-31 DIAGNOSIS — F39 MOOD DISORDER (HCC): ICD-10-CM

## 2025-03-31 PROCEDURE — 90792 PSYCH DIAG EVAL W/MED SRVCS: CPT | Performed by: STUDENT IN AN ORGANIZED HEALTH CARE EDUCATION/TRAINING PROGRAM

## 2025-03-31 RX ORDER — VENLAFAXINE HYDROCHLORIDE 37.5 MG/1
37.5 CAPSULE, EXTENDED RELEASE ORAL DAILY
Qty: 30 CAPSULE | Refills: 2 | Status: SHIPPED | OUTPATIENT
Start: 2025-03-31 | End: 2025-04-07

## 2025-03-31 RX ORDER — LAMOTRIGINE 200 MG/1
200 TABLET ORAL
Qty: 30 TABLET | Refills: 2 | Status: SHIPPED | OUTPATIENT
Start: 2025-03-31
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
## 2025-04-02 NOTE — ASSESSMENT & PLAN NOTE
We will start patient on Effexor XR 37.5 mg daily.  Will monitor for any mood changes while on the medication, and will continue with BuSpar 30 mg daily at bedtime, as well as Klonopin 1 mg 3 times daily as needed anxiety.  Plan to trying to taper patient off of Klonopin eventually, to make her less reliant on taking the medication at least once a day.  Orders:    venlafaxine (EFFEXOR-XR) 37.5 mg 24 hr capsule; Take 1 capsule (37.5 mg total) by mouth daily

## 2025-04-03 ENCOUNTER — TELEPHONE (OUTPATIENT)
Age: 35
End: 2025-04-03

## 2025-04-03 DIAGNOSIS — F39 MOOD DISORDER (HCC): ICD-10-CM

## 2025-04-03 DIAGNOSIS — F41.0 PANIC DISORDER: Primary | ICD-10-CM

## 2025-04-03 NOTE — TELEPHONE ENCOUNTER
Called Nevin and left AFSHIN requesting a call back to discuss Dr. Barron recommendations:  Please recommend that she discontinue the medication.  These symptoms should resolve if they are related to the Effexor within 24 hours, but if she continues to have muscle cramping or notices fever, chills, nausea or tremors that persist after 24 hours after stopping the medication, I would recommend she go to the emergency department.

## 2025-04-03 NOTE — TELEPHONE ENCOUNTER
Nevin Dye and/or Parent/Guardian contacted the office with a medication concern.       Name of Medication Effexor XR       Concerns/ Side effects patient stated she is having severe muscle cramps throughout her body even in her face. Patient tries to stretch them out but she feels that she should not take the medication anymore. Patient would like a call back      Please Review. Thank you.

## 2025-04-07 RX ORDER — GABAPENTIN 300 MG/1
300 CAPSULE ORAL 2 TIMES DAILY
Qty: 60 CAPSULE | Refills: 1 | Status: SHIPPED | OUTPATIENT
Start: 2025-04-07

## 2025-04-07 NOTE — TELEPHONE ENCOUNTER
Patient returning the call to see if provider prescribed another medication.  Patient stated she stopped the Venlafaxine and once she did the muscle cramps went away.    Please call patient to advise if another medication will be prescribed for her.

## 2025-04-07 NOTE — TELEPHONE ENCOUNTER
Called and spoke with patient to discuss options for her anxiety; will start gabapentin 300mg BID; may help with mood lability as well.  Risks and benefits of medication discussed. Will d/c effexor.

## 2025-04-29 ENCOUNTER — TELEMEDICINE (OUTPATIENT)
Dept: PSYCHIATRY | Facility: CLINIC | Age: 35
End: 2025-04-29
Payer: COMMERCIAL

## 2025-04-29 DIAGNOSIS — F41.0 PANIC DISORDER: Primary | ICD-10-CM

## 2025-04-29 DIAGNOSIS — F43.10 POST TRAUMATIC STRESS DISORDER (PTSD): ICD-10-CM

## 2025-04-29 DIAGNOSIS — F39 MOOD DISORDER (HCC): ICD-10-CM

## 2025-04-29 PROCEDURE — 90833 PSYTX W PT W E/M 30 MIN: CPT | Performed by: STUDENT IN AN ORGANIZED HEALTH CARE EDUCATION/TRAINING PROGRAM

## 2025-04-29 PROCEDURE — 99214 OFFICE O/P EST MOD 30 MIN: CPT | Performed by: STUDENT IN AN ORGANIZED HEALTH CARE EDUCATION/TRAINING PROGRAM

## 2025-04-29 RX ORDER — BUSPIRONE HYDROCHLORIDE 30 MG/1
30 TABLET ORAL 2 TIMES DAILY
Qty: 180 TABLET | Refills: 1 | Status: SHIPPED | OUTPATIENT
Start: 2025-04-29

## 2025-04-29 RX ORDER — LAMOTRIGINE 200 MG/1
200 TABLET ORAL
Qty: 90 TABLET | Refills: 1 | Status: SHIPPED | OUTPATIENT
Start: 2025-04-29

## 2025-04-29 RX ORDER — QUETIAPINE FUMARATE 200 MG/1
200 TABLET, FILM COATED ORAL
Qty: 90 TABLET | Refills: 1 | Status: SHIPPED | OUTPATIENT
Start: 2025-04-29

## 2025-04-29 RX ORDER — GABAPENTIN 300 MG/1
CAPSULE ORAL
Qty: 180 CAPSULE | Refills: 2 | Status: SHIPPED | OUTPATIENT
Start: 2025-04-29

## 2025-04-29 RX ORDER — CELECOXIB 200 MG/1
200 CAPSULE ORAL 2 TIMES DAILY
COMMUNITY
Start: 2025-04-08

## 2025-04-29 RX ORDER — CLONAZEPAM 1 MG/1
1 TABLET ORAL
Qty: 60 TABLET | Refills: 0 | Status: SHIPPED | OUTPATIENT
Start: 2025-04-29

## 2025-04-30 NOTE — ASSESSMENT & PLAN NOTE
Will increase gabapentin to 300 mg daily in the morning and 600 mg nightly.  Could consider increasing further if tolerable.  Continue with buspirone 30 mg twice daily.  Continue with Klonopin 1 mg daily as needed for anxiety, patient primarily taking this medication at bedtime, but may need less with increased dose of gabapentin.  Orders:    gabapentin (Neurontin) 300 mg capsule; Take one tablet in the morning and two tablets daily at bedtime    busPIRone (BUSPAR) 30 MG tablet; Take 1 tablet (30 mg total) by mouth 2 (two) times a day    clonazePAM (KlonoPIN) 1 mg tablet; Take 1 tablet (1 mg total) by mouth daily at bedtime as needed for anxiety

## 2025-05-01 ENCOUNTER — OFFICE VISIT (OUTPATIENT)
Age: 35
End: 2025-05-01
Payer: COMMERCIAL

## 2025-05-01 VITALS
OXYGEN SATURATION: 98 % | SYSTOLIC BLOOD PRESSURE: 122 MMHG | TEMPERATURE: 95.8 F | HEIGHT: 66 IN | DIASTOLIC BLOOD PRESSURE: 74 MMHG | WEIGHT: 293 LBS | BODY MASS INDEX: 47.09 KG/M2 | RESPIRATION RATE: 14 BRPM | HEART RATE: 113 BPM

## 2025-05-01 DIAGNOSIS — R07.9 CHEST PAIN, UNSPECIFIED TYPE: ICD-10-CM

## 2025-05-01 DIAGNOSIS — Z86.018 HISTORY OF ATRIAL MYXOMA: ICD-10-CM

## 2025-05-01 DIAGNOSIS — K21.9 GASTROESOPHAGEAL REFLUX DISEASE, UNSPECIFIED WHETHER ESOPHAGITIS PRESENT: Primary | ICD-10-CM

## 2025-05-01 PROCEDURE — 99214 OFFICE O/P EST MOD 30 MIN: CPT

## 2025-05-01 RX ORDER — OMEPRAZOLE 20 MG/1
20 CAPSULE, DELAYED RELEASE ORAL DAILY
Qty: 30 CAPSULE | Refills: 2 | Status: SHIPPED | OUTPATIENT
Start: 2025-05-01

## 2025-05-01 RX ORDER — METHOCARBAMOL 750 MG/1
750 TABLET, FILM COATED ORAL 4 TIMES DAILY PRN
COMMUNITY
Start: 2025-04-30 | End: 2025-05-10

## 2025-05-01 RX ORDER — NAPROXEN 500 MG/1
500 TABLET ORAL EVERY 12 HOURS PRN
COMMUNITY
Start: 2025-04-30 | End: 2025-05-07

## 2025-05-01 RX ORDER — PREDNISONE 20 MG/1
40 TABLET ORAL DAILY
COMMUNITY
Start: 2025-04-30 | End: 2025-05-05

## 2025-05-01 RX ORDER — OXYCODONE AND ACETAMINOPHEN 5; 325 MG/1; MG/1
TABLET ORAL
COMMUNITY
Start: 2025-04-30

## 2025-05-01 NOTE — ASSESSMENT & PLAN NOTE
Discussed that weight loss can assist with reducing acid reflux symptoms.  Patient has been seen by weight management in the past, but not for some time.  Not currently on any weight loss medications.  Recommend follow-up with weight management for discussion of options.  Orders:  •  Ambulatory Referral to Weight Management; Future

## 2025-05-01 NOTE — PROGRESS NOTES
Name: Nevin Dye      : 1990      MRN: [id number]  Encounter Provider: Debbie Draper PA-C  Encounter Date: 2025   Encounter department: [facility]  :  Assessment & Plan  Gastroesophageal reflux disease, unspecified whether esophagitis present  Discussed that her symptoms seem most consistent with GERD, but due to occasional chest pains unrelated to eating and history of atrial myxoma, I recommend following up with cardiology as well.  Referral placed.  For the heartburn I recommend treating with omeprazole 20 mg daily for the next 6 weeks.  Follow-up in 6 weeks for reevaluation.  If no significant improvement in symptoms, may recommend follow-up with GI.  Discussed that since she takes NSAIDs regularly for pain relief, that can increase risk for peptic ulcers, but endoscopy would be needed to confirm that.   Orders:  •  omeprazole (PriLOSEC) 20 mg delayed release capsule; Take 1 capsule (20 mg total) by mouth daily    Chest pain, unspecified type  Discussed that her symptoms seem most consistent with GERD, but due to occasional chest pains unrelated to eating and history of atrial myxoma, I recommend following up with cardiology as well.  Referral placed.  Recommend she go to the ER if she has worsening or persistent chest pains.  Orders:  •  Ambulatory Referral to Cardiology; Future    Body mass index 50.0-59.9, adult (HCC)  Discussed that weight loss can assist with reducing acid reflux symptoms.  Patient has been seen by weight management in the past, but not for some time.  Not currently on any weight loss medications.  Recommend follow-up with weight management for discussion of options.  Orders:  •  Ambulatory Referral to Weight Management; Future    History of atrial myxoma  S/p surgical removal in 2018  Orders:  •  Ambulatory Referral to Cardiology; Future           History of Present Illness   Nevin Dye is a 34-year-old female presenting to the office with  complaint of heartburn and occasional chest pain.  She states that her cousin recently passed away from a heart attack, but his symptoms started with what he thought was heartburn.  She gets heartburn/burning sensation in her chest about 2-3 times a week after eating spaghetti, pizza or drinking coffee.  She has tried taking some over-the-counter medications like famotidine and generic Prilosec, but did not take them consistently because the packaging was has not to use for more than 2 weeks before consulting a doctor.  She states that other than the heartburn associated with eating, she will infrequently get chest pains.  She has a history of atrial myxoma tumor that was removed years ago.    Of note, yesterday she was in the ER for her back and hip pain.  They did a CAT scan that showed stenosis of the L5-S1 region.  Does he has established with [facility] orthopedics/pain management to discuss her hip pain.  They ordered an MRI which is still awaiting insurance approval.  They told her that she would need to complete physical therapy before MRI could be approved, but she is not certain that she could do physical therapy because of the amount of pain that she is in.  She received steroids and a pain cocktail in the ER yesterday that temporarily helped with her pain.      Review of Systems   Constitutional:  Negative for chills and fever.   HENT:  Negative for congestion, ear pain and sore throat.    Eyes:  Negative for pain and visual disturbance.   Respiratory:  Negative for cough and shortness of breath.    Cardiovascular:  Positive for chest pain (None currently). Negative for palpitations.   Gastrointestinal:  Negative for abdominal pain, constipation, diarrhea and vomiting.   Genitourinary:  Negative for dysuria and hematuria.   Musculoskeletal:  Negative for arthralgias, back pain and myalgias.   Skin:  Negative for color change and rash.   Neurological:  Negative for dizziness, seizures, syncope and  "headaches.   All other systems reviewed and are negative.      Objective   /74 (BP Location: Left arm, Patient Position: Sitting)   Pulse (!) 113   Temp (!) 95.8 °F (35.4 °C) (Tympanic)   Resp 14   Ht 5' 6.25\" (1.683 m)   Wt (!) 174 kg (382 lb 9.6 oz)   SpO2 98%   BMI 61.29 kg/m²      Physical Exam  Vitals and nursing note reviewed.   Constitutional:       General: She is not in acute distress.     Appearance: She is well-developed.   Eyes:      Extraocular Movements: Extraocular movements intact.      Conjunctiva/sclera: Conjunctivae normal.   Cardiovascular:      Rate and Rhythm: Normal rate and regular rhythm.      Heart sounds: Normal heart sounds. No murmur heard.     No friction rub. No gallop.   Pulmonary:      Effort: Pulmonary effort is normal. No respiratory distress.      Breath sounds: Normal breath sounds. No wheezing, rhonchi or rales.   Musculoskeletal:         General: No swelling.   Skin:     General: Skin is warm and dry.      Capillary Refill: Capillary refill takes less than 2 seconds.   Neurological:      General: No focal deficit present.      Mental Status: She is alert. Mental status is at baseline.   Psychiatric:         Mood and Affect: Mood normal.         Behavior: Behavior normal.         "

## 2025-05-02 NOTE — PSYCH
MEDICATION MANAGEMENT NOTE    Name: Nevin Dye      : 1990      MRN: [id number]  Encounter Provider: Giorgi Barron DO  Encounter Date: 2025   Encounter department: [facility]    Insurance: Payor: BLUE CROSS / Plan: MISC BLUE CROSS / Product Type: Blue Fee for Service /      Reason for Visit:   Chief Complaint   Patient presents with    Virtual Regular Visit   :  Assessment & Plan  Panic disorder  Will increase gabapentin to 300 mg daily in the morning and 600 mg nightly.  Could consider increasing further if tolerable.  Continue with buspirone 30 mg twice daily.  Continue with Klonopin 1 mg daily as needed for anxiety, patient primarily taking this medication at bedtime, but may need less with increased dose of gabapentin.  Orders:    gabapentin (Neurontin) 300 mg capsule; Take one tablet in the morning and two tablets daily at bedtime    busPIRone (BUSPAR) 30 MG tablet; Take 1 tablet (30 mg total) by mouth 2 (two) times a day    clonazePAM (KlonoPIN) 1 mg tablet; Take 1 tablet (1 mg total) by mouth daily at bedtime as needed for anxiety    Mood disorder (HCC)  Continue with Lamictal 200 mg daily at bedtime, Seroquel 200 mg daily at bedtime, and will increase gabapentin to 300 mg daily morning at 600 mg daily at bedtime.  Orders:    gabapentin (Neurontin) 300 mg capsule; Take one tablet in the morning and two tablets daily at bedtime    lamoTRIgine (LaMICtal) 200 MG tablet; Take 1 tablet (200 mg total) by mouth daily at bedtime    QUEtiapine (SEROquel) 200 mg tablet; Take 1 tablet (200 mg total) by mouth daily at bedtime    Post traumatic stress disorder (PTSD)  Offered emotional support, patient not currently in therapy.         Treatment Recommendations:    Educated about diagnosis and treatment modalities. Verbalizes understanding and agreement with the treatment plan.  Discussed self monitoring of symptoms, and symptom monitoring tools.  Discussed  "medications and if treatment adjustment was needed or desired.  Medication management with psychotherapy every 4 weeks  Aware of 24 hour and weekend coverage for urgent situations accessed by calling [facility] main practice number  No longer sees a therapist  I am scheduling this patient out for greater than 3 months: No    Medications Risks/Benefits:      Risks, Benefits And Possible Side Effects Of Medications:    Risks, benefits, and possible side effects of medications explained to Nevin and she (or legal representative) verbalizes understanding and agreement for treatment.    Controlled Medication Discussion:     Nevin has been filling controlled prescriptions on time as prescribed according to [place] Prescription Drug Monitoring Program.  Discussed with Nevin the risks of sedation, respiratory depression, impairment of ability to drive and potential for abuse and addiction related to treatment with benzodiazepine medications. She understands risk of treatment with benzodiazepine medications, agrees to not drive if feels impaired and agrees to take medications as prescribed.  Nevin is using medication appropriately.      History of Present Illness     Nevin is seen today for follow-up.  Patient states that she is feeling \"pretty good\", noting she is tolerating the gabapentin, and is agreeable to increasing the medication further to help with some lingering anxiety and pain.  States that she has seen a pain specialist, who also recommended she discuss increasing her gabapentin.  States she still has anxiety when going to stores, feels panicky, has to hyper self up, will spend some time in the car in a parking lot.  States she is completing work from home, and continues to feel relaxed throughout the day using medical marijuana.  States that it also helps with her hip pain, and she is hopeful she will be able to get an MRI soon.  Reports compliance with her other " medications, including Klonopin, which she primarily is taking at bedtime.  Denies any thoughts to hurt herself or anyone else.  States she has a lot of support from her family, including her  and daughter.      Review Of Systems: A review of systems is obtained and is negative except for the pertinent positives listed in HPI/Subjective above.      Current Rating Scores:     None completed today.    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    Surgery 2018 per note dated 11/24/2023    Bipolar 1 disorder (HCC) 2018    Cardiac abnormality 06/02/2022    Surgery, now stable, per note dated 11/24/2023    Major depressive disorder 2018    Panic      Past Surgical History:   Procedure Laterality Date    ATRIAL MYXOMA EXCISION  09/10/2018    CHOLECYSTECTOMY  04/2014    WART EXCISION       Allergies:   Allergies   Allergen Reactions    Penicillins Other (See Comments)    Sulfa Antibiotics Other (See Comments)       Current Outpatient Medications   Medication Instructions    acetaminophen (TYLENOL) 650 mg, Oral, Every 8 hours PRN    albuterol (PROVENTIL HFA,VENTOLIN HFA) 90 mcg/act inhaler TAKE 2 PUFFS BY MOUTH EVERY 6 HOURS AS NEEDED FOR WHEEZE    busPIRone (BUSPAR) 30 mg, Oral, 2 times daily    carisoprodol (SOMA) 350 mg, Oral, 3 times daily PRN    celecoxib (CELEBREX) 200 mg, 2 times daily    clonazePAM (KLONOPIN) 1 mg, Oral, Daily at bedtime PRN    gabapentin (Neurontin) 300 mg capsule Take one tablet in the morning and two tablets daily at bedtime    ibuprofen (MOTRIN) 400 mg, Oral, Every 6 hours PRN    lamoTRIgine (LAMICTAL) 200 mg, Oral, Daily at bedtime    QUEtiapine (SEROQUEL) 200 mg, Oral, Daily at bedtime    SUMAtriptan (IMITREX) 25 mg, Oral, Once as needed    traZODone (DESYREL) 100 mg tablet TAKE 1-3 TABLET(S) ORAL EVERY DAY        Substance Abuse  History:    Tobacco, Alcohol and Drug Use History     Tobacco Use    Smoking status: Former     Current packs/day: 0.00     Types: Cigarettes     Quit date: 2018     Years since quittin.3    Smokeless tobacco: Never   Vaping Use    Vaping status: Some Days    Substances: THC   Substance Use Topics    Alcohol use: Yes     Comment: occassionaly    Drug use: Yes     Types: Marijuana     Comment: has medical marjuana card          Social History:    Social History     Socioeconomic History    Marital status: /Civil Union     Spouse name: Not on file    Number of children: Not on file    Years of education: Not on file    Highest education level: Not on file   Occupational History    Not on file   Other Topics Concern    Not on file   Social History Narrative    Not on file        Family Psychiatric History:     Family History   Problem Relation Age of Onset    Hypertension Maternal Grandmother     Cataracts Maternal Grandmother     Skin cancer Maternal Grandmother     Diabetes Maternal Grandfather     Heart block Maternal Grandfather        Medical History Reviewed by provider this encounter:          Objective   There were no vitals taken for this visit.     Mental Status Evaluation:    Appearance age appropriate, casually dressed   Behavior cooperative, calm, more relaxed appearing, not tearful   Speech normal rate, normal volume, normal pitch, spontaneous   Mood continues to report anxious, less dysphoric   Affect brighter   Thought Processes organized, goal directed   Thought Content no overt delusions   Perceptual Disturbances: no auditory hallucinations, no visual hallucinations   Abnormal Thoughts  Risk Potential Suicidal ideation - None  Homicidal ideation - None  Potential for aggression - No   Orientation oriented to person, place, time/date, and situation   Memory recent and remote memory grossly intact   Consciousness alert and awake   Attention Span Concentration Span attention span and  concentration are age appropriate   Intellect appears to be of average intelligence   Insight intact   Judgement intact   Muscle Strength and  Gait normal muscle strength and normal muscle tone, normal gait and normal balance   Motor activity no abnormal movements   Language no difficulty naming common objects, no difficulty repeating a phrase, no difficulty writing a sentence   Fund of Knowledge adequate knowledge of current events  adequate fund of knowledge regarding past history  adequate fund of knowledge regarding vocabulary        Laboratory Results: I have personally reviewed all pertinent laboratory/tests results    Last Visit Labs:   No visits with results within 1 Month(s) from this visit.   Latest known visit with results is:   Appointment on 10/25/2024   Component Date Value    Magnesium 10/25/2024 2.0     Hemoglobin A1C 10/25/2024 5.5     EAG 10/25/2024 111        Suicide/Homicide Risk Assessment:    Risk of Harm to Self:  The following ratings are based on assessment at the time of the interview  Based on today's assessment, Nevin presents the following risk of harm to self: none    Risk of Harm to Others:  The following ratings are based on assessment at the time of the interview  Based on today's assessment, Nevin presents the following risk of harm to others: none    The following interventions are recommended: Continue medication management. No other intervention changes indicated at this time.    Psychotherapy Provided:     Individual psychotherapy provided: Yes    Counseling was provided during the session today for 17 minutes.  Goals discussed during in session: continue improvement in anxiety, maintain control of depression, continue improvement in PTSD symptoms, and increase interaction with people in the community.  Discussed with Nevin coping with health issues, medical problems, everyday stressors, ongoing anxiety, and chronic mental illness.  Coping skills including getting into a  "good routine, increasing social contact, maintain heathy sleeping hygiene, maintain positive attitude, relaxation, and taking time for self reviewed with Nevin.   Supportive therapy provided.     Treatment Plan:    Completed and signed during the session: Not applicable - Treatment Plan not due at this session.    Goals: Progress towards Treatment Plan goals - Yes, progressing, as evidenced by subjective findings in HPI/Subjective Section and in Assessment and Plan Section    Depression Follow-up Plan Completed: Yes    Note Share:    This note was shared with patient.    Administrative Statements   Administrative Statements   Encounter provider Giorgi Barron DO    The Patient is located at Home and in the following state in which I hold an active license [place].    The patient was identified by name and date of birth. Nevin Dye was informed that this is a telemedicine visit and that the visit is being conducted through the Epic Embedded platform. She agrees to proceed..  My office door was closed. No one else was in the room.  She acknowledged consent and understanding of privacy and security of the video platform. The patient has agreed to participate and understands they can discontinue the visit at any time.    I have spent a total time of  35 minutes in caring for this patient on the day of the visit/encounter including Counseling / Coordination of care, not including the time spent for establishing the audio/video connection.    Visit Time  Visit Start Time: 1330  Visit Stop Time: 1405  Total Visit Duration: 35 minutes    Portions of the record may have been created with voice recognition software. Occasional wrong word or \"sound a like\" substitutions may have occurred due to the inherent limitations of voice recognition software. Read the chart carefully and recognize, using context, where substitutions have occurred.    Giorgi Barron DO 04/30/25  "

## 2025-05-05 ENCOUNTER — TELEPHONE (OUTPATIENT)
Dept: PSYCHIATRY | Facility: CLINIC | Age: 35
End: 2025-05-05

## 2025-05-05 DIAGNOSIS — F41.0 PANIC DISORDER: ICD-10-CM

## 2025-05-05 RX ORDER — CLONAZEPAM 1 MG/1
1 TABLET ORAL 3 TIMES DAILY PRN
Qty: 90 TABLET | Refills: 0 | Status: SHIPPED | OUTPATIENT
Start: 2025-05-10

## 2025-05-05 NOTE — TELEPHONE ENCOUNTER
Patient returned call.     Writer relayed previous writers msg.     Patient has additional question.     Writer attempted to warm transfer to clinical staff.     Writer informed patient [facility] will be relay and someone will be in contact.

## 2025-05-05 NOTE — TELEPHONE ENCOUNTER
"Nurse spoke with Nevin # [phone]    Per Nevin,  previous provider prescribed Klonopin 1 mg TID PRN    I use this as needed during the day - I have a hard time being in social situations or go to the grocery store without using Klonopin, as well as using 1 mg Q HS.    Can I have a script to use more than Q HS?        Fighting more with  - stupid arguments, \"I don't know why I am getting so upset and emotional\".   My gabapentin was increased and I was put on a 5 day course of steroids (took last dose of steroid today)   r/t spinal stenosis and I don't know if this is making me more emotional.         Please advise.   "

## 2025-05-05 NOTE — TELEPHONE ENCOUNTER
Called Nevin and left  requesting a call back to review Dr. Barron message:   Please let patient know that the steroid may be making her more emotional and to monitor how she does in the next few days as she has finished her course of steroids.  I will adjust her signature for the Klonopin to reflect Klonopin 1mg TID PRN anxiety, but would recommend she use sparingly and only as needed as she is increasing her gabapentin, and not use at same time as her oxycodone she was recently prescribed.    I will put earliest fill date for the Klonopin is 5/10 if she has been using it three times a day since she picked it up last week, and put a note on the pharmacy that we are adjusting her to previous signature.

## 2025-05-06 NOTE — TELEPHONE ENCOUNTER
Nevin Dye called and  requested a call back to discuss following up on her medication with the nurses. Writer tried to warm transfer to the nurses line but was unable to connect..    They can be reached at P# [phone] pt asked to get a call back between 4 and 5 if possible she has training for the next 3 hours.       Thank you.

## 2025-05-06 NOTE — TELEPHONE ENCOUNTER
Called Nevin [phone] - left vague VM informing her that Dr. Barron increased one of her medications (klonopin) but to use it sparingly. Informed her to monitor herself the next few days as she finishes her course of steroids as this can be making her more emotional. Requested she call back:     Upon return call - if you cannot reach nursing please inform her not to take klonopin and oxycodone at the same time. Advise her that she filled klonopin with earliest fill date of 5/10.

## 2025-05-09 ENCOUNTER — TELEPHONE (OUTPATIENT)
Dept: PSYCHIATRY | Facility: CLINIC | Age: 35
End: 2025-05-09

## 2025-05-09 DIAGNOSIS — F39 MOOD DISORDER (HCC): Primary | ICD-10-CM

## 2025-05-09 RX ORDER — TRAZODONE HYDROCHLORIDE 300 MG/1
300 TABLET ORAL
Qty: 90 TABLET | Refills: 1 | Status: SHIPPED | OUTPATIENT
Start: 2025-05-09

## 2025-05-09 NOTE — TELEPHONE ENCOUNTER
Patient called the RX Refill Line. Message is being forwarded to the office.     Patient is requesting to have the trazodone refilled, stated this will be the first time Dr Barron will be filling it as it was handled by her previous provider    She said that she's been taking the 100 mg 3 at night but she'd be ok with getting a 300 mg and taking just 1. She has enough at home to get her through the weekend but will need a new script for Monday     She would like a 90 day supply of it and would like it sent to the [facility] on [address]     Please contact patient at

## 2025-05-23 DIAGNOSIS — K21.9 GASTROESOPHAGEAL REFLUX DISEASE, UNSPECIFIED WHETHER ESOPHAGITIS PRESENT: ICD-10-CM

## 2025-05-25 RX ORDER — OMEPRAZOLE 20 MG/1
20 CAPSULE, DELAYED RELEASE ORAL DAILY
Qty: 90 CAPSULE | Refills: 1 | Status: SHIPPED | OUTPATIENT
Start: 2025-05-25

## 2025-05-28 ENCOUNTER — OFFICE VISIT (OUTPATIENT)
Dept: BARIATRICS | Facility: CLINIC | Age: 35
End: 2025-05-28

## 2025-05-28 VITALS
DIASTOLIC BLOOD PRESSURE: 68 MMHG | HEART RATE: 87 BPM | BODY MASS INDEX: 47.09 KG/M2 | HEIGHT: 66 IN | WEIGHT: 293 LBS | OXYGEN SATURATION: 98 % | SYSTOLIC BLOOD PRESSURE: 118 MMHG

## 2025-05-28 DIAGNOSIS — E66.01 MORBID (SEVERE) OBESITY DUE TO EXCESS CALORIES (HCC): Primary | ICD-10-CM

## 2025-05-28 DIAGNOSIS — E78.2 ELEVATED TRIGLYCERIDES WITH HIGH CHOLESTEROL: ICD-10-CM

## 2025-05-28 NOTE — PROGRESS NOTES
Assessment/Plan:     Morbid (severe) obesity due to excess calories (HCC)  - Consultation with Dr. Sanchez.   - Discussed options of HealthyCORE-Intensive Lifestyle Intervention Program, Very Low Calorie Diet-VLCD, Conservative Program, Curtis-En-Y Gastric Bypass, and Vertical Sleeve Gastrectomy and the role of weight loss medications.  - Patient is interested in pursuing Curtis-En-Y Gastric Bypass and Vertical Sleeve Gastrectomy and follow up visits with medical weight management provider.  - Explained the importance of making lifestyle changes in addition to starting any anti-obesity medications.   - Initial weight loss goal of 5-10% weight loss for improved health. Weight loss can improve patient's co-morbid conditions and/or prevent weight-related complications.  - Weight is not at goal and patient has been unable to achieve a meaningful weight loss above 5% using various programs and tools for more than 6 months  - Labs reviewed.     General Recommendations:  Nutrition:  Eat breakfast daily.  Do not skip meals.      Food log (ie.) www.Pick a Student.com, sparkpeople.com, loseit.com, calorieking.com, etc.     Practice mindful eating.  Be sure to set aside time to eat, eat slowly, and savor your food.     Hydration:    At least 64oz of water daily.  No sugar sweetened beverages.  No juice (eat the fruit instead).     Exercise:  Studies have shown that the ideal exercise goal is somewhere between 150 to 300 minutes of moderate intensity exercise a week.  Start with exercising 10 minutes every other day and gradually increase physical activity with a goal of at least 150 minutes of moderate intensity exercise a week, divided over at least 3 days a week.  An example of this would be exercising 30 minutes a day, 5 days a week.  Resistance training can increase muscle mass and increase our resting metabolic rate.   FULL BODY resistance training is recommended 2-3 times a week.  Do not do this on consecutive days to allow for  muscle recovery.     Aim for a bare minimum 5000 steps, even on days you do not exercise.     Monitoring:   Weigh yourself daily.  If this causes undue stress, then just weigh yourself once a week.  Weigh yourself the same time of the day with the same amount of clothing on.  Preferably this should be done after waking up, before you eat, and with no clothing or minimal clothing on.     Specific Goals:  Calorie goal:  1800 felisha/day (Provided with meal plan to follow)         Nevin was seen today for follow-up.    Diagnoses and all orders for this visit:    Morbid (severe) obesity due to excess calories (HCC)    Elevated triglycerides with high cholesterol    Other orders  -     Ambulatory Referral to Weight Management        Total time spent reviewing chart, interviewing patient, examining patient, discussing plan, answering all questions, and documentin minutes with >50% face-to-face time with the patient.    Follow up in approximately 3 months with Non-Surgical Physician/Advanced Practitioner.    Subjective:   Chief Complaint   Patient presents with    Follow-up       Patient ID: Nevin Dye  is a 34 y.o. female with excess weight/obesity here to pursue weight management.  Patient is pursuing Conservative Program.   Most recent notes and records were reviewed. Last seen May 2024. Started on Metformin 500 mg BID. Significant weight gain since birth of second daughter. Not interested in injectables. Would like to see Dr. Sanchez.     HPI    Wt Readings from Last 20 Encounters:   25 (!) 178 kg (393 lb)   25 (!) 174 kg (382 lb 9.6 oz)   25 (!) 169 kg (373 lb 3.2 oz)   25 (!) 170 kg (374 lb)   25 (!) 170 kg (373 lb 12.8 oz)   25 (!) 170 kg (373 lb 12.8 oz)   25 (!) 166 kg (366 lb)   10/25/24 (!) 161 kg (354 lb 9.6 oz)   10/15/24 (!) 161 kg (355 lb)   24 (!) 161 kg (355 lb 6.4 oz)   24 (!) 150 kg (329 lb 9.6 oz)   24 (!) 151 kg (332 lb 12.8 oz)   23  (!) 144 kg (316 lb 12.8 oz)   10/16/23 (!) 139 kg (306 lb 6.4 oz)   07/13/23 122 kg (269 lb)   01/23/23 (!) 159 kg (350 lb 6.4 oz)   01/20/23 112 kg (246 lb 12.8 oz)   11/29/22 109 kg (240 lb 6.4 oz)   08/30/22 122 kg (268 lb)   08/30/22 119 kg (261 lb 11.2 oz)       Patient presents today to medical weight management office for follow up.    Weight loss medication and dose: metformin 500 mg BID  Started weight and date: 329 lbs (February 2024)  Current weight: 393 lbs (05/28/2025)  Difference: +64 lbs     Starting BMI: 52.80 (February 2024)  Current BMI: 62.95 (05/28/2025)    Tried keto twice 40 lbs 1st time ( summer   -50 lbs this last summer   - Did well      B-- keto coffees  L--  D -- protein, veggies  S -- snacks throughout day - whatever is PBJ, leftovers  Eating out vs cooking at home- 50/50     Beverages  Water--  not enough   Caffeine/tea--  2 cups   Alcohol-- occasionally SSB- gatorade sugar free     Physical Activity -- not very much, hip hurts, minimal, sedantary job      The following portions of the patient's history were reviewed and updated as appropriate: allergies, current medications, past family history, past medical history, past social history, past surgical history, and problem list.    GLP-1 Contraindicated due to TG >300     Phentermine Contraindicated due to personal history of palpitations.    Family History[1]     Review of Systems   Constitutional:  Negative for chills and fever.   HENT:  Negative for ear pain and sore throat.    Eyes:  Negative for pain and visual disturbance.   Respiratory:  Negative for cough and shortness of breath.    Cardiovascular:  Negative for chest pain and palpitations.   Gastrointestinal:  Negative for abdominal pain and vomiting.   Genitourinary:  Negative for dysuria and hematuria.   Musculoskeletal:  Negative for arthralgias and back pain.   Skin:  Negative for color change and rash.   Neurological:  Negative for seizures and syncope.   All other systems  "reviewed and are negative.      Objective:  /68 (BP Location: Left arm, Patient Position: Sitting, Cuff Size: Large)   Pulse 87   Ht 5' 6.25\" (1.683 m)   Wt (!) 178 kg (393 lb)   LMP 05/19/2025 (Approximate)   SpO2 98%   BMI 62.95 kg/m²     Physical Exam  Constitutional:       General: She is not in acute distress.     Appearance: Normal appearance. She is obese. She is not ill-appearing, toxic-appearing or diaphoretic.   HENT:      Head: Normocephalic and atraumatic.   Pulmonary:      Effort: Pulmonary effort is normal.     Musculoskeletal:         General: Normal range of motion.      Cervical back: Normal range of motion.     Skin:     General: Skin is warm.     Neurological:      Mental Status: She is alert and oriented to person, place, and time.     Psychiatric:         Mood and Affect: Mood normal.         Behavior: Behavior normal.            Labs   Most recent labs reviewed   Lab Results   Component Value Date    SODIUM 138 10/25/2024    K 4.1 10/25/2024     10/25/2024    CO2 22 10/25/2024    AGAP 10 10/25/2024    BUN 15 10/25/2024    CREATININE 0.87 10/25/2024    GLUF 102 (H) 10/25/2024    CALCIUM 8.7 10/25/2024    AST 11 (L) 10/25/2024    ALT 13 10/25/2024    ALKPHOS 71 10/25/2024    TP 6.5 10/25/2024    TBILI 0.40 10/25/2024    EGFR 85 10/25/2024     Lab Results   Component Value Date    HGBA1C 5.5 10/25/2024     Lab Results   Component Value Date    NSV6WVUINOTX 1.440 06/04/2022     Lab Results   Component Value Date    CHOLESTEROL 191 10/25/2024     Lab Results   Component Value Date    HDL 37 (L) 10/25/2024     Lab Results   Component Value Date    TRIG 446 (H) 10/25/2024     Lab Results   Component Value Date    LDLCALC  10/25/2024      Comment:      Calculated LDL invalid, triglycerides >400 mg/dl  This screening LDL is a calculated result.   It does not have the accuracy of the Direct Measured LDL in the monitoring of patients with hyperlipidemia and/or statin therapy.   Direct " Measure LDL (ITJ481) must be ordered separately in these patients.           Weight Management  Conrad Pino PA-C         [1]   Family History  Problem Relation Name Age of Onset    Hypertension Maternal Grandmother      Cataracts Maternal Grandmother      Skin cancer Maternal Grandmother      Diabetes Maternal Grandfather      Heart block Maternal Grandfather

## 2025-05-28 NOTE — PATIENT INSTRUCTIONS
- Consultation with Dr. Sanchez.   - Discussed options of HealthyCORE-Intensive Lifestyle Intervention Program, Very Low Calorie Diet-VLCD, Conservative Program, Curtis-En-Y Gastric Bypass, and Vertical Sleeve Gastrectomy and the role of weight loss medications.  - Patient is interested in pursuing Curtis-En-Y Gastric Bypass and Vertical Sleeve Gastrectomy and follow up visits with medical weight management provider.  - Explained the importance of making lifestyle changes in addition to starting any anti-obesity medications.   - Initial weight loss goal of 5-10% weight loss for improved health. Weight loss can improve patient's co-morbid conditions and/or prevent weight-related complications.  - Weight is not at goal and patient has been unable to achieve a meaningful weight loss above 5% using various programs and tools for more than 6 months  - Labs reviewed.     General Recommendations:  Nutrition:  Eat breakfast daily.  Do not skip meals.      Food log (ie.) www.ShrinkTheWeb.com, sparkpeople.com, loseit.com, calorieking.com, etc.     Practice mindful eating.  Be sure to set aside time to eat, eat slowly, and savor your food.     Hydration:    At least 64oz of water daily.  No sugar sweetened beverages.  No juice (eat the fruit instead).     Exercise:  Studies have shown that the ideal exercise goal is somewhere between 150 to 300 minutes of moderate intensity exercise a week.  Start with exercising 10 minutes every other day and gradually increase physical activity with a goal of at least 150 minutes of moderate intensity exercise a week, divided over at least 3 days a week.  An example of this would be exercising 30 minutes a day, 5 days a week.  Resistance training can increase muscle mass and increase our resting metabolic rate.   FULL BODY resistance training is recommended 2-3 times a week.  Do not do this on consecutive days to allow for muscle recovery.     Aim for a bare minimum 5000 steps, even on days you do  not exercise.     Monitoring:   Weigh yourself daily.  If this causes undue stress, then just weigh yourself once a week.  Weigh yourself the same time of the day with the same amount of clothing on.  Preferably this should be done after waking up, before you eat, and with no clothing or minimal clothing on.     Specific Goals:  Calorie goal:  1800 felisha/day (Provided with meal plan to follow)

## 2025-05-28 NOTE — ASSESSMENT & PLAN NOTE
- Consultation with Dr. Sanchez.   - Discussed options of HealthyCORE-Intensive Lifestyle Intervention Program, Very Low Calorie Diet-VLCD, Conservative Program, Curtis-En-Y Gastric Bypass, and Vertical Sleeve Gastrectomy and the role of weight loss medications.  - Patient is interested in pursuing Curtis-En-Y Gastric Bypass and Vertical Sleeve Gastrectomy and follow up visits with medical weight management provider.  - Explained the importance of making lifestyle changes in addition to starting any anti-obesity medications.   - Initial weight loss goal of 5-10% weight loss for improved health. Weight loss can improve patient's co-morbid conditions and/or prevent weight-related complications.  - Weight is not at goal and patient has been unable to achieve a meaningful weight loss above 5% using various programs and tools for more than 6 months  - Labs reviewed.     General Recommendations:  Nutrition:  Eat breakfast daily.  Do not skip meals.      Food log (ie.) www.[employer].com, sparkpeople.com, loseit.com, calorieking.com, etc.     Practice mindful eating.  Be sure to set aside time to eat, eat slowly, and savor your food.     Hydration:    At least 64oz of water daily.  No sugar sweetened beverages.  No juice (eat the fruit instead).     Exercise:  Studies have shown that the ideal exercise goal is somewhere between 150 to 300 minutes of moderate intensity exercise a week.  Start with exercising 10 minutes every other day and gradually increase physical activity with a goal of at least 150 minutes of moderate intensity exercise a week, divided over at least 3 days a week.  An example of this would be exercising 30 minutes a day, 5 days a week.  Resistance training can increase muscle mass and increase our resting metabolic rate.   FULL BODY resistance training is recommended 2-3 times a week.  Do not do this on consecutive days to allow for muscle recovery.     Aim for a bare minimum 5000 steps, even on days you do  not exercise.     Monitoring:   Weigh yourself daily.  If this causes undue stress, then just weigh yourself once a week.  Weigh yourself the same time of the day with the same amount of clothing on.  Preferably this should be done after waking up, before you eat, and with no clothing or minimal clothing on.     Specific Goals:  Calorie goal:  1800 felisha/day (Provided with meal plan to follow)

## 2025-06-03 DIAGNOSIS — F39 MOOD DISORDER (HCC): ICD-10-CM

## 2025-06-03 RX ORDER — QUETIAPINE FUMARATE 200 MG/1
200 TABLET, FILM COATED ORAL
Qty: 90 TABLET | Refills: 0 | Status: SHIPPED | OUTPATIENT
Start: 2025-06-03 | End: 2025-06-12

## 2025-06-03 NOTE — TELEPHONE ENCOUNTER
Reason for call:   [x] Refill   [] Prior Auth  [] Other:     Office:   [] PCP/Provider -   [x] Specialty/Provider - [facility]     Medication: QUEtiapine (SEROquel) 200 mg tablet     Dose/Frequency: 200 mg, Daily at bedtime     Quantity: 90    Pharmacy: [facility] #[id number]    Local Pharmacy   Does the patient have enough for 3 days?   [] Yes   [x] No - Send as HP to POD    Mail Away Pharmacy   Does the patient have enough for 10 days?   [] Yes   [] No - Send as HP to POD

## 2025-06-12 ENCOUNTER — TELEMEDICINE (OUTPATIENT)
Dept: PSYCHIATRY | Facility: CLINIC | Age: 35
End: 2025-06-12
Payer: COMMERCIAL

## 2025-06-12 DIAGNOSIS — F43.10 POST TRAUMATIC STRESS DISORDER (PTSD): ICD-10-CM

## 2025-06-12 DIAGNOSIS — F41.0 PANIC DISORDER: ICD-10-CM

## 2025-06-12 DIAGNOSIS — F39 MOOD DISORDER (HCC): Primary | ICD-10-CM

## 2025-06-12 DIAGNOSIS — F50.811 MODERATE BINGE-EATING DISORDER: ICD-10-CM

## 2025-06-12 PROCEDURE — 90833 PSYTX W PT W E/M 30 MIN: CPT | Performed by: STUDENT IN AN ORGANIZED HEALTH CARE EDUCATION/TRAINING PROGRAM

## 2025-06-12 PROCEDURE — 99214 OFFICE O/P EST MOD 30 MIN: CPT | Performed by: STUDENT IN AN ORGANIZED HEALTH CARE EDUCATION/TRAINING PROGRAM

## 2025-06-12 RX ORDER — CLONAZEPAM 1 MG/1
1 TABLET ORAL 3 TIMES DAILY PRN
Qty: 90 TABLET | Refills: 0 | Status: SHIPPED | OUTPATIENT
Start: 2025-06-12

## 2025-06-12 RX ORDER — QUETIAPINE FUMARATE 100 MG/1
150 TABLET, FILM COATED ORAL
Qty: 45 TABLET | Refills: 1 | Status: SHIPPED | OUTPATIENT
Start: 2025-06-12

## 2025-06-12 NOTE — PSYCH
MEDICATION MANAGEMENT NOTE    Name: Nevin Dye      : 1990      MRN: [id number]  Encounter Provider: Giorgi Barron DO  Encounter Date: 2025   Encounter department: [facility]    Insurance: Payor: BLUE CROSS / Plan: MISC BLUE CROSS / Product Type: Blue Fee for Service /      Reason for Visit:   Chief Complaint   Patient presents with    Virtual Regular Visit     :  Assessment & Plan  Mood disorder (HCC)  Will try reducing Seroquel to 150 mg daily at bedtime, to reduce possible side effect of weight gain from medication.  Continue with gabapentin 300 mg daily in the morning, and 600 mg daily at bedtime.  Continue Lamictal 200 mg daily at bedtime.  Could consider possibly discontinuing Seroquel, and trialing Rexulti or Abilify, or possibly Geodon.  Continue with trazodone 300mg daily at bedtime.        Panic disorder  Continue with BuSpar 30 mg twice daily.       Post traumatic stress disorder (PTSD)  Continue Klonopin 1 mg 3 times daily as needed for anxiety.           Treatment Recommendations:    Educated about diagnosis and treatment modalities. Verbalizes understanding and agreement with the treatment plan.  Discussed self monitoring of symptoms, and symptom monitoring tools.  Discussed medications and if treatment adjustment was needed or desired.  Aware of 24 hour and weekend coverage for urgent situations accessed by calling [facility] main practice number  I am scheduling this patient out for greater than 3 months: No    Medications Risks/Benefits:      Risks, Benefits And Possible Side Effects Of Medications:    Risks, benefits, and possible side effects of medications explained to Nevin and she (or legal representative) verbalizes understanding and agreement for treatment.    Controlled Medication Discussion:     Nevin has been filling controlled prescriptions on time as prescribed according to [place] Prescription Drug  "Monitoring Program.  Nevin is using medication appropriately.      History of Present Illness     Nevin is a 34 yr old patient with a history of mood disorder, seen today for followup.  Patient reports struggling with worsening symptoms of anxiety.  States had panic attack in physical therapy.  Was hyperventilating, \"it hurt breathing\".  Admits feeling defeated by physical therapy, and frustrated by her leg pain.  States she is waiting for MRI possibly of her back, but had to fail physical therapy first.  States she has been only able to lay on her one side in bed, and admits she has been struggling with her snacking as well.  States she has not been staying very active, due to pain, and find herself snacking during the day.  Also will binge eating sometimes, such as picking up food to eat before dinner.  States she did follow up with bariatric surgery, possibly going to pursue this, but working with dietetics.    Review Of Systems: A review of systems is obtained and is negative except for the pertinent positives listed in HPI/Subjective above.      Current Rating Scores:     None completed today.    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    Surgery 2018 per note dated 11/24/2023    Bipolar 1 disorder (HCC) 2018    Cardiac abnormality 06/02/2022    Surgery, now stable, per note dated 11/24/2023    Major depressive disorder 2018    Panic      Past Surgical History:   Procedure Laterality Date    ATRIAL MYXOMA EXCISION  09/10/2018    CHOLECYSTECTOMY  04/2014    WART EXCISION       Allergies:   Allergies   Allergen Reactions    Penicillins Other (See Comments)    Sulfa Antibiotics Other (See Comments)       Current Outpatient Medications   Medication Instructions    acetaminophen (TYLENOL) 650 mg, Oral, Every 8 hours PRN    albuterol (PROVENTIL HFA,VENTOLIN HFA) 90 " mcg/act inhaler TAKE 2 PUFFS BY MOUTH EVERY 6 HOURS AS NEEDED FOR WHEEZE    busPIRone (BUSPAR) 30 mg, Oral, 2 times daily    carisoprodol (SOMA) 350 mg, Oral, 3 times daily PRN    celecoxib (CELEBREX) 200 mg, 2 times daily    clonazePAM (KLONOPIN) 1 mg, Oral, 3 times daily PRN    gabapentin (Neurontin) 300 mg capsule Take one tablet in the morning and two tablets daily at bedtime    ibuprofen (MOTRIN) 400 mg, Oral, Every 6 hours PRN    lamoTRIgine (LAMICTAL) 200 mg, Oral, Daily at bedtime    naloxone (NARCAN) 4 mg/0.1 mL nasal spray Administer 1 spray into a nostril. If no response after 2-3 minutes, give another dose in the other nostril using a new spray.    naproxen (NAPROSYN) 500 mg, Every 12 hours PRN    omeprazole (PRILOSEC) 20 mg, Oral, Daily    oxyCODONE-acetaminophen (PERCOCET) 5-325 mg per tablet     QUEtiapine (SEROQUEL) 200 mg, Oral, Daily at bedtime    SUMAtriptan (IMITREX) 25 mg, Oral, Once as needed    traZODone (DESYREL) 300 mg, Oral, Daily at bedtime        Substance Abuse History:    Tobacco, Alcohol and Drug Use History     Tobacco Use    Smoking status: Former     Current packs/day: 0.00     Types: Cigarettes     Quit date:      Years since quittin.4    Smokeless tobacco: Never   Vaping Use    Vaping status: Some Days    Substances: THC   Substance Use Topics    Alcohol use: Yes     Comment: occassionaly    Drug use: Yes     Types: Marijuana     Comment: has medical marjuana card          Social History:    Social History     Socioeconomic History    Marital status: /Civil Union     Spouse name: Not on file    Number of children: Not on file    Years of education: Not on file    Highest education level: Not on file   Occupational History    Not on file   Other Topics Concern    Not on file   Social History Narrative    Not on file        Family Psychiatric History:     Family History   Problem Relation Name Age of Onset    Hypertension Maternal Grandmother      Cataracts Maternal  Grandmother      Skin cancer Maternal Grandmother      Diabetes Maternal Grandfather      Heart block Maternal Grandfather         Medical History Reviewed by provider this encounter:          Objective   LMP 05/19/2025 (Approximate)      Mental Status Evaluation:    Appearance age appropriate, casually dressed   Behavior cooperative, calm   Speech normal rate, normal volume, normal pitch, spontaneous   Mood euthymic   Affect normal range and intensity, appropriate   Thought Processes organized, goal directed   Thought Content no overt delusions   Perceptual Disturbances: no auditory hallucinations, no visual hallucinations   Abnormal Thoughts  Risk Potential Suicidal ideation - None  Homicidal ideation - None  Potential for aggression - No   Orientation oriented to person, place, time/date, and situation   Memory recent and remote memory grossly intact   Consciousness alert and awake   Attention Span Concentration Span attention span and concentration are age appropriate   Intellect appears to be of average intelligence   Insight intact   Judgement intact   Muscle Strength and  Gait normal muscle strength and normal muscle tone, normal gait and normal balance   Motor activity no abnormal movements   Language no difficulty naming common objects, no difficulty repeating a phrase, no difficulty writing a sentence   Fund of Knowledge adequate knowledge of current events  adequate fund of knowledge regarding past history  adequate fund of knowledge regarding vocabulary        Laboratory Results: I have personally reviewed all pertinent laboratory/tests results    Last Visit Labs:   No visits with results within 1 Month(s) from this visit.   Latest known visit with results is:   Appointment on 10/25/2024   Component Date Value    Magnesium 10/25/2024 2.0     Hemoglobin A1C 10/25/2024 5.5     EAG 10/25/2024 111        Suicide/Homicide Risk Assessment:    Risk of Harm to Self:  The following ratings are based on assessment  at the time of the interview  Based on today's assessment, Nevin presents the following risk of harm to self: none    Risk of Harm to Others:  The following ratings are based on assessment at the time of the interview  Based on today's assessment, Nevin presents the following risk of harm to others: none    The following interventions are recommended: Continue medication management. No other intervention changes indicated at this time.    Psychotherapy Provided:     Individual psychotherapy provided: Yes    Counseling was provided during the session today for 17 minutes.  Goals discussed during in session: continue improvement in anxiety and maintain control of appetite.  Coping skills including mindfulness, relaxed breathing, stress reduction, and taking time for self reviewed with Nevin.   Cognitive therapy was utilized during the session.    Treatment Plan:    Completed and signed during the session: Not applicable - Treatment Plan not due at this session.    Goals: Progress towards Treatment Plan goals - Yes, progressing, as evidenced by subjective findings in HPI/Subjective Section and in Assessment and Plan Section    Depression Follow-up Plan Completed: Yes    Note Share:    This note was shared with patient.    Administrative Statements   Administrative Statements   Encounter provider Giorgi Barron, DO    The Patient is located at Home and in the following state in which I hold an active license [place].    The patient was identified by name and date of birth. Nevin Dye was informed that this is a telemedicine visit and that the visit is being conducted through the Epic Embedded platform. She agrees to proceed..  My office door was closed. No one else was in the room.  She acknowledged consent and understanding of privacy and security of the video platform. The patient has agreed to participate and understands they can discontinue the visit at any time.    I have spent a total time of 35 minutes in  "caring for this patient on the day of the visit/encounter including Counseling / Coordination of care, not including the time spent for establishing the audio/video connection.    Visit Time  Visit Start Time: 1415  Visit Stop Time: 1450  Total Visit Duration: 35 minutes    Portions of the record may have been created with voice recognition software. Occasional wrong word or \"sound a like\" substitutions may have occurred due to the inherent limitations of voice recognition software. Read the chart carefully and recognize, using context, where substitutions have occurred.    Giorgi Barron, DO 06/12/25  "

## 2025-06-20 ENCOUNTER — TELEPHONE (OUTPATIENT)
Age: 35
End: 2025-06-20

## 2025-06-20 DIAGNOSIS — F39 MOOD DISORDER (HCC): Primary | ICD-10-CM

## 2025-06-20 DIAGNOSIS — F41.0 PANIC DISORDER: ICD-10-CM

## 2025-06-20 RX ORDER — GABAPENTIN 400 MG/1
CAPSULE ORAL
Qty: 90 CAPSULE | Refills: 1 | Status: SHIPPED | OUTPATIENT
Start: 2025-06-20

## 2025-06-20 NOTE — TELEPHONE ENCOUNTER
Patient called in and asked to speak with her provider or a nurse. She stated she had a panic attack earlier while driving and had to pull over. This was the second one in 2 weeks. Writer was able to warm transfer to the nurses line for assistance.

## 2025-06-20 NOTE — TELEPHONE ENCOUNTER
LM on Nevin's VM informing her that provider sent in a script for an increased dose of Gabapentin for a 400 MG capsule.  She can take 400 MG in the morning and 800 MG at bedtime.  Requested she call the office if she has any questions.

## 2025-06-20 NOTE — TELEPHONE ENCOUNTER
Nevin called the office to inform provider that she had another panic attack.  It was the second one she had in 2 weeks.  States she was hyperventilating.  She had an appointment with her pain management doctor also who recommends that she speak to this provider about increasing her Gabapentin since it also helps with pain as well as her anxiety and panic.  Nevin reports that she has a lot going on and she cries a lot.  She is requesting provider recommendation.    Will refer to Dr Barron for review.

## 2025-07-01 ENCOUNTER — OFFICE VISIT (OUTPATIENT)
Dept: BARIATRICS | Facility: CLINIC | Age: 35
End: 2025-07-01
Payer: COMMERCIAL

## 2025-07-01 VITALS
SYSTOLIC BLOOD PRESSURE: 124 MMHG | HEART RATE: 64 BPM | WEIGHT: 293 LBS | DIASTOLIC BLOOD PRESSURE: 80 MMHG | HEIGHT: 66 IN | BODY MASS INDEX: 47.09 KG/M2 | RESPIRATION RATE: 16 BRPM

## 2025-07-01 DIAGNOSIS — E78.5 HYPERLIPIDEMIA: ICD-10-CM

## 2025-07-01 DIAGNOSIS — E66.813 CLASS 3 SEVERE OBESITY DUE TO EXCESS CALORIES WITHOUT SERIOUS COMORBIDITY WITH BODY MASS INDEX (BMI) OF 60.0 TO 69.9 IN ADULT: Primary | ICD-10-CM

## 2025-07-01 PROCEDURE — 99244 OFF/OP CNSLTJ NEW/EST MOD 40: CPT | Performed by: SURGERY

## 2025-07-01 NOTE — ASSESSMENT & PLAN NOTE
34 y.o. yo female with a long standing h/o of obesity and inability to sustain any meaningful weight loss on her own despite several attempts.    She is interested in the Laparoscopic sleeve gastrectomy.  We did discuss the need for multimodal therapy given her initial BMI which would include medications and the potential for the sleeve gastrectomy to be a staged approach to a single anastomosis duodenal ileostomy.    Patient has been counseled about the risk of developing gastroesophageal reflux disease (GERD), worsening of current GERD and/or silent reflux with the sleeve gastrectomy. Patient has also been counseled on the risk of developing Dacosta's esophagus (18%). As a result the patient may require treatment with medications, further interventions and possibly additional surgery. Patient will require routine endoscopic surveillance to monitor for these possible complications.     As a part of her pre op evaluation, she will be referred to a cardiologist and for a sleep evaluation and consult after successfully completing an evaluation with our pre-certification/, registered dietician and licensed clinical .    She needs an EGD to evaluate the anatomy of her GI tract prior to the operation.  I have spent over 45 minutes with her face to face in the office today discussing her options and details of the surgery. Over 50% of this was coordinating care.    She was given the opportunity to ask questions and I have answered all of them.  I have discussed and educated the patient with regards to the components of our multidisciplinary program and the importance of compliance and follow up in the post operative period. The patient was also instructed with regards to the importance of behavior modification, nutritional counseling, support meeting attendance and lifestyle changes that are important to ensure success.     Although there is a great statistical chance of improvement or  even resolution of most of her associated comorbidities, the results vary from patient to patient and they largely depend on her commitment and compliance.

## 2025-07-01 NOTE — PROGRESS NOTES
"    BARIATRIC INITIAL CONSULT - BARIATRIC SURGERY    Nevin Dye 34 y.o. female MRN: [id number]  Unit/Bed#:  Encounter: [id number]      HPI:  Nevin Dye is a 34 y.o. female who presents with a longstanding history of morbid obesity and inability to sustain a meaningful weight loss.  She is .  She has a daughter that is 17 years old.  She is a supervisor customer service sleep apnea machines.  She desires to pursue metabolic and bariatric surgery to improve her health.  Positive GERD.  Positive NSAIDs.  Denies DVT/PE.  History of cardiac myxoma requiring open heart surgery (there was felt that a piece of this myxoma created the appearance of a PE).  History of laparoscopic cholecystectomy.  Here today to discuss bariatric options.    Visit type: initial visit    Symptoms: weight increase, inability to loss weight, and back pain    Associated Symptoms: none    Associated Conditions: low HDL and elevated triglycerides  Disease Complications: none  Weight Loss Interest: high    Exercise Frequency:sedentary  Types of Exercise: walking      Review of Systems   Gastrointestinal:         GERD   Musculoskeletal:  Positive for back pain.   All other systems reviewed and are negative.      Historical Information   Past Medical History[1]  Past Surgical History[2]  Social History   Social History     Substance and Sexual Activity   Alcohol Use Yes    Comment: occassionaly     Social History     Substance and Sexual Activity   Drug Use Yes    Types: Marijuana    Comment: has medical marjuana card     Tobacco Use History[3]  Family History: Family history non-contributory    Meds/Allergies   all medications and allergies reviewed  Allergies[4]    Objective       Current Vitals:   /80 (BP Location: Left arm, Patient Position: Sitting, Cuff Size: Large)   Pulse 64   Resp 16   Ht 5' 6.25\" (1.683 m)   Wt (!) 174 kg (382 lb 12.8 oz)   BMI 61.32 kg/m²       Invasive Devices       None                   Physical " Exam  Constitutional:       Appearance: Normal appearance.   HENT:      Head: Atraumatic.      Nose: No rhinorrhea.     Eyes:      Extraocular Movements: Extraocular movements intact.       Cardiovascular:      Rate and Rhythm: Normal rate.   Pulmonary:      Effort: Pulmonary effort is normal. No respiratory distress.   Abdominal:      General: Abdomen is flat. There is no distension.     Musculoskeletal:         General: Normal range of motion.      Cervical back: Normal range of motion.     Skin:     General: Skin is warm and dry.     Neurological:      General: No focal deficit present.      Mental Status: She is alert and oriented to person, place, and time.     Psychiatric:         Mood and Affect: Mood normal.         Behavior: Behavior normal.         Lab Results: I have personally reviewed pertinent lab results.    Imaging: Results Review Statement: No pertinent imaging studies reviewed.  EKG, Pathology, and Other Studies: Results Review Statement: No pertinent imaging studies reviewed.      Assessment/PLAN:    Class 3 severe obesity due to excess calories without serious comorbidity with body mass index (BMI) of 60.0 to 69.9 in adult  34 y.o. yo female with a long standing h/o of obesity and inability to sustain any meaningful weight loss on her own despite several attempts.    She is interested in the Laparoscopic sleeve gastrectomy.  We did discuss the need for multimodal therapy given her initial BMI which would include medications and the potential for the sleeve gastrectomy to be a staged approach to a single anastomosis duodenal ileostomy.    Patient has been counseled about the risk of developing gastroesophageal reflux disease (GERD), worsening of current GERD and/or silent reflux with the sleeve gastrectomy. Patient has also been counseled on the risk of developing Dacosta's esophagus (18%). As a result the patient may require treatment with medications, further interventions and possibly  additional surgery. Patient will require routine endoscopic surveillance to monitor for these possible complications.     As a part of her pre op evaluation, she will be referred to a cardiologist and for a sleep evaluation and consult after successfully completing an evaluation with our pre-certification/, registered dietician and licensed clinical .    She needs an EGD to evaluate the anatomy of her GI tract prior to the operation.  I have spent over 45 minutes with her face to face in the office today discussing her options and details of the surgery. Over 50% of this was coordinating care.    She was given the opportunity to ask questions and I have answered all of them.  I have discussed and educated the patient with regards to the components of our multidisciplinary program and the importance of compliance and follow up in the post operative period. The patient was also instructed with regards to the importance of behavior modification, nutritional counseling, support meeting attendance and lifestyle changes that are important to ensure success.     Although there is a great statistical chance of improvement or even resolution of most of her associated comorbidities, the results vary from patient to patient and they largely depend on her commitment and compliance.     Hyperlipidemia  High triglycerides with low HDL  Anticipate improvement after metabolic surgery  Continue care per treatment team        Vincenzo Miles MD  7/1/2025  11:05 AM          [1]   Past Medical History:  Diagnosis Date    Anxiety     Atrial myxoma 11/24/2023    Surgery 2018 per note dated 11/24/2023    Bipolar 1 disorder (HCC) 2018    Cardiac abnormality 06/02/2022    Surgery, now stable, per note dated 11/24/2023    Major depressive disorder 2018    Panic    [2]   Past Surgical History:  Procedure Laterality Date    ATRIAL MYXOMA EXCISION  09/10/2018    CHOLECYSTECTOMY  04/2014    WART EXCISION      [3]   Social History  Tobacco Use   Smoking Status Former    Current packs/day: 0.00    Types: Cigarettes    Quit date:     Years since quittin.5   Smokeless Tobacco Never   [4]   Allergies  Allergen Reactions    Penicillins Other (See Comments)    Sulfa Antibiotics Other (See Comments)

## 2025-07-01 NOTE — ASSESSMENT & PLAN NOTE
High triglycerides with low HDL  Anticipate improvement after metabolic surgery  Continue care per treatment team

## 2025-07-01 NOTE — LETTER
July 1, 2025     Dilia Gongora DO  [address] [facility] [address]    Patient: Nevin Dye   YOB: 1990   Date of Visit: 7/1/2025       Dear DO Debbie Cabrera PA-C:    Thank you for referring Nevin Dye to me for evaluation for metabolic and bariatric surgery. Below are my notes for this consultation.    If you have questions, please do not hesitate to call me. I look forward to following your patient along with you.         Sincerely,        Vincenzo Miles MD        CC: KRISHNA Dorman MD  7/1/2025 11:05 AM  Sign when Signing Visit      BARIATRIC INITIAL CONSULT - BARIATRIC SURGERY    Nevin Dye 34 y.o. female MRN: [id number]  Unit/Bed#:  Encounter: [id number]      HPI:  Nevin Dye is a 34 y.o. female who presents with a longstanding history of morbid obesity and inability to sustain a meaningful weight loss.  She is .  She has a daughter that is 17 years old.  She is a supervisor customer service sleep apnea machines.  She desires to pursue metabolic and bariatric surgery to improve her health.  Positive GERD.  Positive NSAIDs.  Denies DVT/PE.  History of cardiac myxoma requiring open heart surgery (there was felt that a piece of this myxoma created the appearance of a PE).  History of laparoscopic cholecystectomy.  Here today to discuss bariatric options.    Visit type: initial visit    Symptoms: weight increase, inability to loss weight, and back pain    Associated Symptoms: none    Associated Conditions: low HDL and elevated triglycerides  Disease Complications: none  Weight Loss Interest: high    Exercise Frequency:sedentary  Types of Exercise: walking      Review of Systems   Gastrointestinal:         GERD   Musculoskeletal:  Positive for back pain.   All other systems reviewed and are negative.      Historical Information  Past Medical History[1]  Past Surgical History[2]  Social  "History  Social History     Substance and Sexual Activity   Alcohol Use Yes    Comment: occassionaly     Social History     Substance and Sexual Activity   Drug Use Yes   • Types: Marijuana    Comment: has medical marjuana card     Tobacco Use History[3]  Family History: Family history non-contributory    Meds/Allergies  all medications and allergies reviewed  Allergies[4]    Objective      Current Vitals:   /80 (BP Location: Left arm, Patient Position: Sitting, Cuff Size: Large)   Pulse 64   Resp 16   Ht 5' 6.25\" (1.683 m)   Wt (!) 174 kg (382 lb 12.8 oz)   BMI 61.32 kg/m²       Invasive Devices       None                   Physical Exam  Constitutional:       Appearance: Normal appearance.   HENT:      Head: Atraumatic.      Nose: No rhinorrhea.     Eyes:      Extraocular Movements: Extraocular movements intact.       Cardiovascular:      Rate and Rhythm: Normal rate.   Pulmonary:      Effort: Pulmonary effort is normal. No respiratory distress.   Abdominal:      General: Abdomen is flat. There is no distension.     Musculoskeletal:         General: Normal range of motion.      Cervical back: Normal range of motion.     Skin:     General: Skin is warm and dry.     Neurological:      General: No focal deficit present.      Mental Status: She is alert and oriented to person, place, and time.     Psychiatric:         Mood and Affect: Mood normal.         Behavior: Behavior normal.         Lab Results: I have personally reviewed pertinent lab results.    Imaging: Results Review Statement: No pertinent imaging studies reviewed.  EKG, Pathology, and Other Studies: Results Review Statement: No pertinent imaging studies reviewed.      Assessment/PLAN:    Class 3 severe obesity due to excess calories without serious comorbidity with body mass index (BMI) of 60.0 to 69.9 in adult  34 y.o. yo female with a long standing h/o of obesity and inability to sustain any meaningful weight loss on her own despite several " attempts.    She is interested in the Laparoscopic sleeve gastrectomy.  We did discuss the need for multimodal therapy given her initial BMI which would include medications and the potential for the sleeve gastrectomy to be a staged approach to a single anastomosis duodenal ileostomy.    Patient has been counseled about the risk of developing gastroesophageal reflux disease (GERD), worsening of current GERD and/or silent reflux with the sleeve gastrectomy. Patient has also been counseled on the risk of developing Dacosta's esophagus (18%). As a result the patient may require treatment with medications, further interventions and possibly additional surgery. Patient will require routine endoscopic surveillance to monitor for these possible complications.     As a part of her pre op evaluation, she will be referred to a cardiologist and for a sleep evaluation and consult after successfully completing an evaluation with our pre-certification/, registered dietician and licensed clinical .    She needs an EGD to evaluate the anatomy of her GI tract prior to the operation.  I have spent over 45 minutes with her face to face in the office today discussing her options and details of the surgery. Over 50% of this was coordinating care.    She was given the opportunity to ask questions and I have answered all of them.  I have discussed and educated the patient with regards to the components of our multidisciplinary program and the importance of compliance and follow up in the post operative period. The patient was also instructed with regards to the importance of behavior modification, nutritional counseling, support meeting attendance and lifestyle changes that are important to ensure success.     Although there is a great statistical chance of improvement or even resolution of most of her associated comorbidities, the results vary from patient to patient and they largely depend on her  commitment and compliance.     Hyperlipidemia  High triglycerides with low HDL  Anticipate improvement after metabolic surgery  Continue care per treatment team        Vincenzo Miles MD  2025  11:05 AM          [1]   Past Medical History:  Diagnosis Date   • Anxiety    • Atrial myxoma 2023    Surgery 2018 per note dated 2023   • Bipolar 1 disorder (HCC) 2018   • Cardiac abnormality 2022    Surgery, now stable, per note dated 2023   • Major depressive disorder 2018   • Panic    [2]   Past Surgical History:  Procedure Laterality Date   • ATRIAL MYXOMA EXCISION  09/10/2018   • CHOLECYSTECTOMY  2014   • WART EXCISION     [3]   Social History  Tobacco Use   Smoking Status Former   • Current packs/day: 0.00   • Types: Cigarettes   • Quit date:    • Years since quittin.5   Smokeless Tobacco Never   [4]   Allergies  Allergen Reactions   • Penicillins Other (See Comments)   • Sulfa Antibiotics Other (See Comments)

## 2025-07-03 ENCOUNTER — TELEPHONE (OUTPATIENT)
Age: 35
End: 2025-07-03

## 2025-07-10 ENCOUNTER — TELEMEDICINE (OUTPATIENT)
Dept: PSYCHIATRY | Facility: CLINIC | Age: 35
End: 2025-07-10
Payer: COMMERCIAL

## 2025-07-10 DIAGNOSIS — F39 MOOD DISORDER (HCC): Primary | ICD-10-CM

## 2025-07-10 DIAGNOSIS — F43.10 POST TRAUMATIC STRESS DISORDER (PTSD): ICD-10-CM

## 2025-07-10 DIAGNOSIS — F41.0 PANIC DISORDER: ICD-10-CM

## 2025-07-10 PROCEDURE — 99214 OFFICE O/P EST MOD 30 MIN: CPT | Performed by: STUDENT IN AN ORGANIZED HEALTH CARE EDUCATION/TRAINING PROGRAM

## 2025-07-10 PROCEDURE — 90833 PSYTX W PT W E/M 30 MIN: CPT | Performed by: STUDENT IN AN ORGANIZED HEALTH CARE EDUCATION/TRAINING PROGRAM

## 2025-07-10 RX ORDER — QUETIAPINE FUMARATE 100 MG/1
200 TABLET, FILM COATED ORAL
Qty: 180 TABLET | Refills: 2 | Status: SHIPPED | OUTPATIENT
Start: 2025-07-10

## 2025-07-10 RX ORDER — QUETIAPINE FUMARATE 100 MG/1
200 TABLET, FILM COATED ORAL
Qty: 180 TABLET | Refills: 2 | Status: SHIPPED | OUTPATIENT
Start: 2025-07-10 | End: 2025-07-10 | Stop reason: SDUPTHER

## 2025-07-10 RX ORDER — GABAPENTIN 600 MG/1
TABLET ORAL
Qty: 90 TABLET | Refills: 1 | Status: SHIPPED | OUTPATIENT
Start: 2025-07-10

## 2025-07-11 NOTE — PSYCH
MEDICATION MANAGEMENT NOTE    Name: Nevin Dye      : 1990      MRN: [id number]  Encounter Provider: Giorgi Barron DO  Encounter Date: 7/10/2025   Encounter department: [facility]    Insurance: Payor: BLUE CROSS / Plan: MISC BLUE CROSS / Product Type: Blue Fee for Service /      Reason for Visit:   Chief Complaint   Patient presents with    Virtual Regular Visit     :  Assessment & Plan  Mood disorder (HCC)  Will continue with Seroquel 200 mg daily at bedtime.  Continue with Lamictal 200 mg daily at bedtime.  Orders:    QUEtiapine (SEROquel) 100 mg tablet; Take 2 tablets (200 mg total) by mouth daily at bedtime    gabapentin (Neurontin) 600 MG tablet; Take one capsule daily in the morning and two capsules daily at bedtime.    Panic disorder  Continue with BuSpar 30 mg twice daily.  Will increase gabapentin to 600 mg daily in the morning and 1200 mg nightly.  Continue with Klonopin 1 mg 3 times daily as needed anxiety.  Orders:    gabapentin (Neurontin) 600 MG tablet; Take one capsule daily in the morning and two capsules daily at bedtime.     Post traumatic stress disorder (PTSD)  Continue with Klonopin 1 mg 3 times daily as needed anxiety.              Treatment Recommendations:    Educated about diagnosis and treatment modalities. Verbalizes understanding and agreement with the treatment plan.  Discussed self monitoring of symptoms, and symptom monitoring tools.  Discussed medications and if treatment adjustment was needed or desired.  Medication management with psychotherapy every 6 weeks  Aware of 24 hour and weekend coverage for urgent situations accessed by calling [facility] main practice number  I am scheduling this patient out for greater than 3 months: No    Medications Risks/Benefits:      Risks, Benefits And Possible Side Effects Of Medications:    Risks, benefits, and possible side effects of medications explained to Nevin and  "she (or legal representative) verbalizes understanding and agreement for treatment.    Controlled Medication Discussion:     Nevin has been filling controlled prescriptions on time as prescribed according to [place] Prescription Drug Monitoring Program.  Nevin is using medication appropriately.      History of Present Illness     Nevin is a 34 year old female, seen today for follow up for mood disorder, panic attacks and PTSD.  During interview today, patient reports still struggling with anxiety and feeling frustrated with current health issues.  States she feels burdensome due to pain, requires assistance with household chores from  and daughter.  States she is hopeful bariatric medicine can assist her with weight loss.  Admits feeling \"like I know I did this to myself\", but admits she is trying.  Concedes her daughter is encouraging her to go swimming with her at community pool.  States she is keeping up with work, has not been too stressful.  Struggled with sleep on lower dose of seroquel, would like to go back to 200mg qHS.  Also waking up in pain, uncomfortable, and even hurting to stand for long periods.  Hopes she can go to festival with friend and walk comfortably.  Denies any worsening depression, other than sadness with current situation, but hopeful will improve.  Denies thoughts to hurt self or others, though notes she sometimes \"feels like they would be better off without me\".        Review Of Systems: A review of systems is obtained and is negative except for the pertinent positives listed in HPI/Subjective above.      Current Rating Scores:     None completed today.    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    Surgery 2018 per note dated 11/24/2023    Bipolar 1 disorder (HCC) 2018    Cardiac abnormality 06/02/2022    Surgery, now stable, " per note dated 2023    Major depressive disorder 2018    Panic      Past Surgical History:   Procedure Laterality Date    ATRIAL MYXOMA EXCISION  09/10/2018    CHOLECYSTECTOMY  2014    WART EXCISION       Allergies:   Allergies   Allergen Reactions    Penicillins Other (See Comments)    Sulfa Antibiotics Other (See Comments)       Current Outpatient Medications   Medication Instructions    acetaminophen (TYLENOL) 650 mg, Oral, Every 8 hours PRN    albuterol (PROVENTIL HFA,VENTOLIN HFA) 90 mcg/act inhaler TAKE 2 PUFFS BY MOUTH EVERY 6 HOURS AS NEEDED FOR WHEEZE    busPIRone (BUSPAR) 30 mg, Oral, 2 times daily    carisoprodol (SOMA) 350 mg, Oral, 3 times daily PRN    celecoxib (CELEBREX) 200 mg, 2 times daily    clonazePAM (KLONOPIN) 1 mg, Oral, 3 times daily PRN    gabapentin (Neurontin) 600 MG tablet Take one capsule daily in the morning and two capsules daily at bedtime.    ibuprofen (MOTRIN) 400 mg, Oral, Every 6 hours PRN    lamoTRIgine (LAMICTAL) 200 mg, Oral, Daily at bedtime    naloxone (NARCAN) 4 mg/0.1 mL nasal spray Administer 1 spray into a nostril. If no response after 2-3 minutes, give another dose in the other nostril using a new spray.    naproxen (NAPROSYN) 500 mg, Every 12 hours PRN    omeprazole (PRILOSEC) 20 mg, Oral, Daily    oxyCODONE-acetaminophen (PERCOCET) 5-325 mg per tablet     QUEtiapine (SEROQUEL) 200 mg, Oral, Daily at bedtime    SUMAtriptan (IMITREX) 25 mg, Oral, Once as needed    tiZANidine (ZANAFLEX) 4 mg tablet take 1 tablet by mouth three times a day as needed for 30 days    traZODone (DESYREL) 300 mg, Oral, Daily at bedtime        Substance Abuse History:    Tobacco, Alcohol and Drug Use History     Tobacco Use    Smoking status: Former     Current packs/day: 0.00     Types: Cigarettes     Quit date:      Years since quittin.5    Smokeless tobacco: Never   Vaping Use    Vaping status: Some Days    Substances: THC   Substance Use Topics    Alcohol use: Yes      Comment: occassionaly    Drug use: Yes     Types: Marijuana     Comment: has medical marjuana card          Social History:    Social History     Socioeconomic History    Marital status: /Civil Union     Spouse name: Not on file    Number of children: Not on file    Years of education: Not on file    Highest education level: Not on file   Occupational History    Not on file   Other Topics Concern    Not on file   Social History Narrative    Not on file        Family Psychiatric History:     Family History   Problem Relation Name Age of Onset    Hypertension Maternal Grandmother      Cataracts Maternal Grandmother      Skin cancer Maternal Grandmother      Diabetes Maternal Grandfather      Heart block Maternal Grandfather         Medical History Reviewed by provider this encounter:  Meds          Objective   There were no vitals taken for this visit.     Mental Status Evaluation:    Appearance age appropriate, casually dressed   Behavior cooperative, mildly anxious, good eye contact   Speech normal rate, normal volume, normal pitch, spontaneous   Mood still somewhat anxious, still somewhat dysphoric   Affect normal range and intensity, appropriate   Thought Processes organized, goal directed   Thought Content no overt delusions   Perceptual Disturbances: none   Abnormal Thoughts  Risk Potential Suicidal ideation - None  Homicidal ideation - None  Potential for aggression - No   Orientation oriented to person, place, time/date, and situation   Memory recent and remote memory grossly intact   Consciousness alert and awake   Attention Span Concentration Span attention span and concentration are age appropriate   Intellect appears to be of average intelligence   Insight intact   Judgement intact   Muscle Strength and  Gait unable to assess today due to virtual visit   Motor activity no abnormal movements   Language intact   Fund of Knowledge adequate knowledge of current events  adequate fund of knowledge  regarding past history  adequate fund of knowledge regarding vocabulary        Laboratory Results: I have personally reviewed all pertinent laboratory/tests results    Last Visit Labs:   No visits with results within 1 Month(s) from this visit.   Latest known visit with results is:   Appointment on 10/25/2024   Component Date Value    Magnesium 10/25/2024 2.0     Hemoglobin A1C 10/25/2024 5.5     EAG 10/25/2024 111        Suicide/Homicide Risk Assessment:    Risk of Harm to Self:  The following ratings are based on assessment at the time of the interview  Based on today's assessment, Nevin presents the following risk of harm to self: none    Risk of Harm to Others:  The following ratings are based on assessment at the time of the interview  Based on today's assessment, Nevin presents the following risk of harm to others: none    The following interventions are recommended: Continue medication management. No other intervention changes indicated at this time.    Psychotherapy Provided:     Individual psychotherapy provided: Yes    Counseling was provided during the session today for 16 minutes.  Medication education provided to Nevin.  Goals discussed during in session: going to the pool with her daughter and improve control of anxiety.  Discussed with Nevin coping with health issues, medical problems, and chronic pain.  Supportive therapy provided.     Treatment Plan:    Completed and signed during the session: Not applicable - Treatment Plan not due at this session.    Goals: Progress towards Treatment Plan goals - Yes, progressing, as evidenced by subjective findings in HPI/Subjective Section and in Assessment and Plan Section    Depression Follow-up Plan Completed: Yes    Note Share:    This note was shared with patient.    Administrative Statements   Administrative Statements   Encounter provider Giorgi Barron, DO    The Patient is located at Home and in the following state in which I hold an active  "license [place].    The patient was identified by name and date of birth. Nevin Dye was informed that this is a telemedicine visit and that the visit is being conducted through the Epic Embedded platform. She agrees to proceed..  My office door was closed. No one else was in the room.  She acknowledged consent and understanding of privacy and security of the video platform. The patient has agreed to participate and understands they can discontinue the visit at any time.    I have spent a total time of 30 minutes in caring for this patient on the day of the visit/encounter including Counseling / Coordination of care, not including the time spent for establishing the audio/video connection.    Visit Time  Visit Start Time: 1132  Visit Stop Time: 1202  Total Visit Duration: 30 minutes    Portions of the record may have been created with voice recognition software. Occasional wrong word or \"sound a like\" substitutions may have occurred due to the inherent limitations of voice recognition software. Read the chart carefully and recognize, using context, where substitutions have occurred.    Giorgi Barron, DO 07/10/25  "

## 2025-07-11 NOTE — ASSESSMENT & PLAN NOTE
Continue with BuSpar 30 mg twice daily.  Will increase gabapentin to 600 mg daily in the morning and 1200 mg nightly.  Continue with Klonopin 1 mg 3 times daily as needed anxiety.  Orders:    gabapentin (Neurontin) 600 MG tablet; Take one capsule daily in the morning and two capsules daily at bedtime.

## 2025-07-15 ENCOUNTER — CLINICAL SUPPORT (OUTPATIENT)
Dept: BARIATRICS | Facility: CLINIC | Age: 35
End: 2025-07-15

## 2025-07-15 DIAGNOSIS — Z98.84 BARIATRIC SURGERY STATUS: Primary | ICD-10-CM

## 2025-07-15 PROCEDURE — RECHECK

## 2025-07-29 ENCOUNTER — CLINICAL SUPPORT (OUTPATIENT)
Dept: BARIATRICS | Facility: CLINIC | Age: 35
End: 2025-07-29

## 2025-07-29 ENCOUNTER — PREP FOR PROCEDURE (OUTPATIENT)
Dept: BARIATRICS | Facility: CLINIC | Age: 35
End: 2025-07-29

## 2025-07-29 ENCOUNTER — TELEPHONE (OUTPATIENT)
Age: 35
End: 2025-07-29

## 2025-07-29 VITALS — BODY MASS INDEX: 61.67 KG/M2 | WEIGHT: 293 LBS

## 2025-07-29 DIAGNOSIS — F43.10 PTSD (POST-TRAUMATIC STRESS DISORDER): ICD-10-CM

## 2025-07-29 DIAGNOSIS — E66.01 MORBID OBESITY (HCC): Primary | ICD-10-CM

## 2025-07-29 DIAGNOSIS — E66.01 MORBID (SEVERE) OBESITY DUE TO EXCESS CALORIES (HCC): ICD-10-CM

## 2025-07-29 DIAGNOSIS — Z01.818 PREOP TESTING: Primary | ICD-10-CM

## 2025-07-29 DIAGNOSIS — Z71.89 ENCOUNTER FOR PRE-BARIATRIC SURGERY COUNSELING AND EDUCATION: Primary | ICD-10-CM

## 2025-07-29 DIAGNOSIS — F41.0 PANIC DISORDER: ICD-10-CM

## 2025-07-29 PROCEDURE — RECHECK: Performed by: SOCIAL WORKER

## 2025-07-29 PROCEDURE — RECHECK

## 2025-08-14 ENCOUNTER — TELEMEDICINE (OUTPATIENT)
Dept: PSYCHIATRY | Facility: CLINIC | Age: 35
End: 2025-08-14
Payer: COMMERCIAL

## 2025-08-18 ENCOUNTER — CLINICAL SUPPORT (OUTPATIENT)
Dept: BARIATRICS | Facility: CLINIC | Age: 35
End: 2025-08-18

## 2025-08-18 VITALS — WEIGHT: 293 LBS | BODY MASS INDEX: 61.86 KG/M2

## 2025-08-18 DIAGNOSIS — E66.01 MORBID (SEVERE) OBESITY DUE TO EXCESS CALORIES (HCC): Primary | ICD-10-CM

## 2025-08-18 PROCEDURE — RECHECK
